# Patient Record
Sex: MALE | Race: WHITE | Employment: OTHER | URBAN - METROPOLITAN AREA
[De-identification: names, ages, dates, MRNs, and addresses within clinical notes are randomized per-mention and may not be internally consistent; named-entity substitution may affect disease eponyms.]

---

## 2018-10-28 ENCOUNTER — APPOINTMENT (OUTPATIENT)
Dept: CT IMAGING | Age: 78
DRG: 194 | End: 2018-10-28
Attending: PHYSICIAN ASSISTANT
Payer: MEDICARE

## 2018-10-28 ENCOUNTER — HOSPITAL ENCOUNTER (INPATIENT)
Age: 78
LOS: 4 days | Discharge: NURSING, REHAB OR LONG-TERM CARE | DRG: 194 | End: 2018-11-02
Attending: EMERGENCY MEDICINE | Admitting: HOSPITALIST
Payer: MEDICARE

## 2018-10-28 ENCOUNTER — APPOINTMENT (OUTPATIENT)
Dept: GENERAL RADIOLOGY | Age: 78
DRG: 194 | End: 2018-10-28
Attending: PHYSICIAN ASSISTANT
Payer: MEDICARE

## 2018-10-28 DIAGNOSIS — S01.01XA LACERATION OF SCALP, INITIAL ENCOUNTER: ICD-10-CM

## 2018-10-28 DIAGNOSIS — M47.9 OSTEOARTHRITIS OF SPINE, UNSPECIFIED SPINAL OSTEOARTHRITIS COMPLICATION STATUS, UNSPECIFIED SPINAL REGION: ICD-10-CM

## 2018-10-28 DIAGNOSIS — J18.9 PNEUMONIA DUE TO INFECTIOUS ORGANISM, UNSPECIFIED LATERALITY, UNSPECIFIED PART OF LUNG: ICD-10-CM
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
## 2018-10-28 DIAGNOSIS — S09.90XA INJURY OF HEAD, INITIAL ENCOUNTER: ICD-10-CM

## 2018-10-28 DIAGNOSIS — G20 PARKINSON DISEASE (HCC): ICD-10-CM

## 2018-10-28 DIAGNOSIS — N39.0 UTI (URINARY TRACT INFECTION), UNCOMPLICATED: ICD-10-CM

## 2018-10-28 DIAGNOSIS — R09.02 HYPOXIA: Primary | ICD-10-CM

## 2018-10-28 LAB
ALBUMIN SERPL-MCNC: 3.2 G/DL (ref 3.4–5)
ALBUMIN/GLOB SERPL: 0.8 {RATIO} (ref 0.8–1.7)
ALP SERPL-CCNC: 83 U/L (ref 45–117)
ALT SERPL-CCNC: 7 U/L (ref 16–61)
ANION GAP SERPL CALC-SCNC: 14 MMOL/L (ref 3–18)
APPEARANCE UR: ABNORMAL
ARTERIAL PATENCY WRIST A: ABNORMAL
AST SERPL-CCNC: 29 U/L (ref 15–37)
BACTERIA URNS QL MICRO: ABNORMAL /HPF
BASE DEFICIT BLD-SCNC: 2 MMOL/L
BASOPHILS # BLD: 0 K/UL (ref 0–0.1)
BASOPHILS NFR BLD: 0 % (ref 0–2)
BDY SITE: ABNORMAL
BILIRUB SERPL-MCNC: 1.8 MG/DL (ref 0.2–1)
BILIRUB UR QL: NEGATIVE
BNP SERPL-MCNC: 251 PG/ML (ref 0–1800)
BUN SERPL-MCNC: 20 MG/DL (ref 7–18)
BUN/CREAT SERPL: 15
CALCIUM SERPL-MCNC: 9.2 MG/DL (ref 8.5–10.1)
CHLORIDE SERPL-SCNC: 105 MMOL/L (ref 100–108)
CK MB CFR SERPL CALC: 1.2 % (ref 0–4)
CK MB SERPL-MCNC: 1.2 NG/ML (ref 5–25)
CK SERPL-CCNC: 100 U/L (ref 39–308)
CO2 SERPL-SCNC: 20 MMOL/L (ref 21–32)
COLOR UR: ABNORMAL
CREAT SERPL-MCNC: 1.3 MG/DL (ref 0.6–1.3)
D DIMER PPP FEU-MCNC: >20 UG/ML(FEU)
DIFFERENTIAL METHOD BLD: ABNORMAL
EOSINOPHIL # BLD: 0.1 K/UL (ref 0–0.4)
EOSINOPHIL NFR BLD: 1 % (ref 0–5)
EPITH CASTS URNS QL MICRO: ABNORMAL /LPF (ref 0–5)
ERYTHROCYTE [DISTWIDTH] IN BLOOD BY AUTOMATED COUNT: 13.8 % (ref 11.6–14.5)
GAS FLOW.O2 O2 DELIVERY SYS: ABNORMAL L/MIN
GLOBULIN SER CALC-MCNC: 3.8 G/DL (ref 2–4)
GLUCOSE SERPL-MCNC: 85 MG/DL (ref 74–99)
GLUCOSE UR STRIP.AUTO-MCNC: NEGATIVE MG/DL
HCO3 BLD-SCNC: 21.8 MMOL/L (ref 22–26)
HCT VFR BLD AUTO: 38.1 % (ref 36–48)
HGB BLD-MCNC: 12.8 G/DL (ref 13–16)
HGB UR QL STRIP: ABNORMAL
KETONES UR QL STRIP.AUTO: ABNORMAL MG/DL
LEUKOCYTE ESTERASE UR QL STRIP.AUTO: ABNORMAL
LYMPHOCYTES # BLD: 2.7 K/UL (ref 0.9–3.6)
LYMPHOCYTES NFR BLD: 28 % (ref 21–52)
MCH RBC QN AUTO: 34.3 PG (ref 24–34)
MCHC RBC AUTO-ENTMCNC: 33.6 G/DL (ref 31–37)
MCV RBC AUTO: 102.1 FL (ref 74–97)
MONOCYTES # BLD: 0.8 K/UL (ref 0.05–1.2)
MONOCYTES NFR BLD: 8 % (ref 3–10)
MUCOUS THREADS URNS QL MICRO: ABNORMAL /LPF
NEUTS SEG # BLD: 6.2 K/UL (ref 1.8–8)
NEUTS SEG NFR BLD: 63 % (ref 40–73)
NITRITE UR QL STRIP.AUTO: NEGATIVE
O2/TOTAL GAS SETTING VFR VENT: 0.21 %
PCO2 BLD: 31.2 MMHG (ref 35–45)
PH BLD: 7.45 [PH] (ref 7.35–7.45)
PH UR STRIP: 5.5 [PH] (ref 5–8)
PLATELET # BLD AUTO: 120 K/UL (ref 135–420)
PMV BLD AUTO: 10.2 FL (ref 9.2–11.8)
PO2 BLD: 55 MMHG (ref 80–100)
POTASSIUM SERPL-SCNC: 4.2 MMOL/L (ref 3.5–5.5)
PROT SERPL-MCNC: 7 G/DL (ref 6.4–8.2)
PROT UR STRIP-MCNC: 30 MG/DL
RBC # BLD AUTO: 3.73 M/UL (ref 4.7–5.5)
RBC #/AREA URNS HPF: ABNORMAL /HPF (ref 0–5)
SAO2 % BLD: 90 % (ref 92–97)
SERVICE CMNT-IMP: ABNORMAL
SODIUM SERPL-SCNC: 139 MMOL/L (ref 136–145)
SP GR UR REFRACTOMETRY: 1.02 (ref 1–1.03)
SPECIMEN TYPE: ABNORMAL
TOTAL RESP. RATE, ITRR: 28
TROPONIN I SERPL-MCNC: <0.02 NG/ML (ref 0–0.04)
UROBILINOGEN UR QL STRIP.AUTO: 1 EU/DL (ref 0.2–1)
WBC # BLD AUTO: 9.8 K/UL (ref 4.6–13.2)
WBC URNS QL MICRO: ABNORMAL /HPF (ref 0–5)

## 2018-10-28 PROCEDURE — 87086 URINE CULTURE/COLONY COUNT: CPT | Performed by: PHYSICIAN ASSISTANT

## 2018-10-28 PROCEDURE — 77030012967 HC SPNG WND OPTPOR BMS -A

## 2018-10-28 PROCEDURE — 82550 ASSAY OF CK (CPK): CPT | Performed by: PHYSICIAN ASSISTANT

## 2018-10-28 PROCEDURE — 81001 URINALYSIS AUTO W/SCOPE: CPT | Performed by: PHYSICIAN ASSISTANT

## 2018-10-28 PROCEDURE — 80053 COMPREHEN METABOLIC PANEL: CPT | Performed by: PHYSICIAN ASSISTANT

## 2018-10-28 PROCEDURE — 96374 THER/PROPH/DIAG INJ IV PUSH: CPT

## 2018-10-28 PROCEDURE — 85025 COMPLETE CBC W/AUTO DIFF WBC: CPT | Performed by: EMERGENCY MEDICINE

## 2018-10-28 PROCEDURE — 93005 ELECTROCARDIOGRAM TRACING: CPT

## 2018-10-28 PROCEDURE — 77030008462 HC STPLR SKN PROX J&J -A

## 2018-10-28 PROCEDURE — 74011250636 HC RX REV CODE- 250/636: Performed by: PHYSICIAN ASSISTANT

## 2018-10-28 PROCEDURE — 83880 ASSAY OF NATRIURETIC PEPTIDE: CPT | Performed by: PHYSICIAN ASSISTANT

## 2018-10-28 PROCEDURE — 74011636320 HC RX REV CODE- 636/320: Performed by: EMERGENCY MEDICINE

## 2018-10-28 PROCEDURE — 71045 X-RAY EXAM CHEST 1 VIEW: CPT

## 2018-10-28 PROCEDURE — 82803 BLOOD GASES ANY COMBINATION: CPT

## 2018-10-28 PROCEDURE — 71275 CT ANGIOGRAPHY CHEST: CPT

## 2018-10-28 PROCEDURE — 85379 FIBRIN DEGRADATION QUANT: CPT | Performed by: PHYSICIAN ASSISTANT

## 2018-10-28 PROCEDURE — 74011000250 HC RX REV CODE- 250: Performed by: PHYSICIAN ASSISTANT

## 2018-10-28 PROCEDURE — 36600 WITHDRAWAL OF ARTERIAL BLOOD: CPT

## 2018-10-28 PROCEDURE — 70450 CT HEAD/BRAIN W/O DYE: CPT

## 2018-10-28 PROCEDURE — 99285 EMERGENCY DEPT VISIT HI MDM: CPT

## 2018-10-28 PROCEDURE — 94762 N-INVAS EAR/PLS OXIMTRY CONT: CPT

## 2018-10-28 RX ORDER — CARBIDOPA AND LEVODOPA 25; 100 MG/1; MG/1
1 TABLET ORAL 3 TIMES DAILY
COMMUNITY

## 2018-10-28 RX ORDER — UREA 10 %
2500 LOTION (ML) TOPICAL DAILY
COMMUNITY

## 2018-10-28 RX ORDER — TAMSULOSIN HYDROCHLORIDE 0.4 MG/1
0.4 CAPSULE ORAL DAILY
COMMUNITY

## 2018-10-28 RX ORDER — GLUCOSAMINE SULFATE 1500 MG
2000 POWDER IN PACKET (EA) ORAL DAILY
COMMUNITY

## 2018-10-28 RX ORDER — PANTOPRAZOLE SODIUM 40 MG/1
40 TABLET, DELAYED RELEASE ORAL DAILY
COMMUNITY

## 2018-10-28 RX ORDER — ASPIRIN 81 MG/1
TABLET ORAL DAILY
COMMUNITY

## 2018-10-28 RX ORDER — PROPRANOLOL HYDROCHLORIDE 80 MG/1
CAPSULE, EXTENDED RELEASE ORAL DAILY
COMMUNITY
End: 2018-11-02

## 2018-10-28 RX ORDER — ALENDRONATE SODIUM 70 MG/1
70 TABLET ORAL
COMMUNITY

## 2018-10-28 RX ORDER — ERGOCALCIFEROL 1.25 MG/1
50000 CAPSULE ORAL
COMMUNITY

## 2018-10-28 RX ORDER — SPIRONOLACTONE 25 MG/1
25 TABLET ORAL DAILY
COMMUNITY

## 2018-10-28 RX ADMIN — CEFTRIAXONE 1 G: 1 INJECTION, POWDER, FOR SOLUTION INTRAMUSCULAR; INTRAVENOUS at 21:53

## 2018-10-28 RX ADMIN — IOPAMIDOL 80 ML: 755 INJECTION, SOLUTION INTRAVENOUS at 23:09

## 2018-10-28 NOTE — ED TRIAGE NOTES
Pt arrived via EMS s/p fall from assisted living facility. Pt states that he tripped before falling, no LOC.

## 2018-10-28 NOTE — ED PROVIDER NOTES
EMERGENCY DEPARTMENT HISTORY AND PHYSICAL EXAM 
 
Date: 10/28/2018 Patient Name: Betty David History of Presenting Illness Chief Complaint Patient presents with  Fall  
  negative LOC  Laceration  
  back of head History Provided By: Patient Chief Complaint: Laceration Duration:  PTA Timing:  Acute Location: Posterior head Modifying Factors: No relieving or worsening factors Associated Symptoms:  mild headache. Additional History (Context):  
5:41 PM 
Betty David is a [de-identified] y.o. male with PMHX of controlled HTN, Parkinson's dz who presents to the emergency department C/O laceration to posterior head secondary to mechanical fall that occurred PTA. Associated sxs include mild headache. Reports that he typically uses a cane to walk, but he forgot it before trying to get up. Pt denies syncope, visual disturbance, neck pain, being on home oxygen, tobacco use, etoh use, and any other sxs or complaints. PCP: Belen, MD Marlene 
 
 
 
Past History Past Medical History: 
Past Medical History:  
Diagnosis Date  BPH (benign prostatic hyperplasia)  GERD (gastroesophageal reflux disease)  Hypertension  Osteopenia  Parkinson's disease ([facility] [address] Past Surgical History: No past surgical history on file. Family History: No family history on file. Social History: 
Social History Tobacco Use  Smoking status: Former Smoker  Smokeless tobacco: Never Used Substance Use Topics  Alcohol use: No  
  Frequency: Never  Drug use: No  
 
 
Allergies: 
No Known Allergies Review of Systems Review of Systems Eyes: Negative for visual disturbance. Musculoskeletal: Negative for neck pain. Skin: Positive for wound (laceration to posterior head). Neurological: Positive for headaches (mild). Negative for syncope. All other systems reviewed and are negative. Physical Exam  
 
Vitals: 10/28/18 2113 10/28/18 2115 10/28/18 2118 10/29/18 0015 BP: 169/68 140/55 112/48 137/50 Pulse: (!) 53 (!) 51 61 62 Resp: 22 27 20 26 Temp:      
SpO2: (!) 89% 90% Weight:      
Height:      
 
Physical Exam  
Constitutional: He is oriented to person, place, and time. He appears well-developed and well-nourished. No distress. HENT:  
Head: Normocephalic. Head is with contusion and with laceration (2 linear lacerations as shown, bleeding controlled). Eyes: Conjunctivae and EOM are normal. Pupils are equal, round, and reactive to light. Neck: Normal range of motion. Neck supple. Cardiovascular: Regular rhythm. Bradycardia present. Pulmonary/Chest: Effort normal and breath sounds normal. He has no wheezes. Abdominal: Soft. Bowel sounds are normal. He exhibits no distension. There is no tenderness. There is no rebound and no guarding. Musculoskeletal: Normal range of motion. He exhibits no edema or tenderness. Neurological: He is alert and oriented to person, place, and time. Mild tremor noted to hand Skin: Skin is warm and dry. Psychiatric: He has a normal mood and affect. His behavior is normal.  
Nursing note and vitals reviewed. Diagnostic Study Results Labs - Recent Results (from the past 12 hour(s)) EKG, 12 LEAD, INITIAL Collection Time: 10/28/18  5:41 PM  
Result Value Ref Range Ventricular Rate 45 BPM  
 Atrial Rate 45 BPM  
 P-R Interval 226 ms  
 QRS Duration 98 ms Q-T Interval 506 ms QTC Calculation (Bezet) 437 ms Calculated P Axis 86 degrees Calculated R Axis -29 degrees Calculated T Axis 10 degrees Diagnosis Marked sinus bradycardia with 1st degree AV block Minimal voltage criteria for LVH, may be normal variant Cannot rule out Anterior infarct , age undetermined Abnormal ECG No previous ECGs available METABOLIC PANEL, COMPREHENSIVE Collection Time: 10/28/18  6:40 PM  
Result Value Ref Range Sodium 139 136 - 145 mmol/L Potassium 4.2 3.5 - 5.5 mmol/L Chloride 105 100 - 108 mmol/L  
 CO2 20 (L) 21 - 32 mmol/L Anion gap 14 3.0 - 18 mmol/L Glucose 85 74 - 99 mg/dL BUN 20 (H) 7.0 - 18 MG/DL Creatinine 1.30 0.6 - 1.3 MG/DL  
 BUN/Creatinine ratio 15 GFR est AA >60 >60 ml/min/1.73m2 GFR est non-AA 53 (L) >60 ml/min/1.73m2 Calcium 9.2 8.5 - 10.1 MG/DL Bilirubin, total 1.8 (H) 0.2 - 1.0 MG/DL  
 ALT (SGPT) 7 (L) 16 - 61 U/L  
 AST (SGOT) 29 15 - 37 U/L Alk. phosphatase 83 45 - 117 U/L Protein, total 7.0 6.4 - 8.2 g/dL Albumin 3.2 (L) 3.4 - 5.0 g/dL Globulin 3.8 2.0 - 4.0 g/dL A-G Ratio 0.8 0.8 - 1.7 CARDIAC PANEL,(CK, CKMB & TROPONIN) Collection Time: 10/28/18  6:40 PM  
Result Value Ref Range  39 - 308 U/L  
 CK - MB 1.2 <3.6 ng/ml CK-MB Index 1.2 0.0 - 4.0 % Troponin-I, Qt. <0.02 0.0 - 0.045 NG/ML  
NT-PRO BNP Collection Time: 10/28/18  6:40 PM  
Result Value Ref Range NT pro- 0 - 1,800 PG/ML  
D DIMER Collection Time: 10/28/18  6:40 PM  
Result Value Ref Range D DIMER >20.00 (H) <0.46 ug/ml(FEU) URINALYSIS W/ RFLX MICROSCOPIC Collection Time: 10/28/18  7:40 PM  
Result Value Ref Range Color DARK YELLOW Appearance CLOUDY Specific gravity 1.017 1.005 - 1.030    
 pH (UA) 5.5 5.0 - 8.0 Protein 30 (A) NEG mg/dL Glucose NEGATIVE  NEG mg/dL Ketone TRACE (A) NEG mg/dL Bilirubin NEGATIVE  NEG Blood MODERATE (A) NEG Urobilinogen 1.0 0.2 - 1.0 EU/dL Nitrites NEGATIVE  NEG Leukocyte Esterase LARGE (A) NEG URINE MICROSCOPIC ONLY Collection Time: 10/28/18  7:40 PM  
Result Value Ref Range WBC 70 to 80 0 - 5 /hpf  
 RBC 0 to 5 0 - 5 /hpf Epithelial cells FEW 0 - 5 /lpf Bacteria 1+ (A) NEG /hpf Mucus FEW (A) NEG /lpf  
CBC WITH AUTOMATED DIFF Collection Time: 10/28/18  9:27 PM  
Result Value Ref Range WBC 9.8 4.6 - 13.2 K/uL  
 RBC 3.73 (L) 4.70 - 5.50 M/uL HGB 12.8 (L) 13.0 - 16.0 g/dL HCT 38.1 36.0 - 48.0 % .1 (H) 74.0 - 97.0 FL  
 MCH 34.3 (H) 24.0 - 34.0 PG  
 MCHC 33.6 31.0 - 37.0 g/dL  
 RDW 13.8 11.6 - 14.5 % PLATELET 780 (L) 583 - 420 K/uL MPV 10.2 9.2 - 11.8 FL  
 NEUTROPHILS 63 40 - 73 % LYMPHOCYTES 28 21 - 52 % MONOCYTES 8 3 - 10 % EOSINOPHILS 1 0 - 5 % BASOPHILS 0 0 - 2 %  
 ABS. NEUTROPHILS 6.2 1.8 - 8.0 K/UL  
 ABS. LYMPHOCYTES 2.7 0.9 - 3.6 K/UL  
 ABS. MONOCYTES 0.8 0.05 - 1.2 K/UL  
 ABS. EOSINOPHILS 0.1 0.0 - 0.4 K/UL  
 ABS. BASOPHILS 0.0 0.0 - 0.1 K/UL  
 DF AUTOMATED    
POC G3 Collection Time: 10/28/18 10:11 PM  
Result Value Ref Range Device: ROOM AIR    
 FIO2 (POC) 0.21 % pH (POC) 7.452 (H) 7.35 - 7.45    
 pCO2 (POC) 31.2 (L) 35.0 - 45.0 MMHG  
 pO2 (POC) 55 (L) 80 - 100 MMHG  
 HCO3 (POC) 21.8 (L) 22 - 26 MMOL/L  
 sO2 (POC) 90 (L) 92 - 97 % Base deficit (POC) 2 mmol/L Allens test (POC) N/A Total resp. rate 28 Site RIGHT RADIAL Specimen type (POC) ARTERIAL Performed by [facility] Radiologic Studies: 
 
12:29 AM 
RADIOLOGY FINDINGS Chest X-ray shows NAP Pending review by Radiologist 
Recorded by REBECA Sandy, as dictated by Claire Morgan PA-C 
 
CTA CHEST W OR W WO CONT Final Result IMPRESSION: 
  
1. No evidence of pulmonary embolism. 
  
2. Lower lung field interstitial coarsening may be chronic versus 
bronchitis/pneumonitis.  
  
3. Significant compression fractures of T5, T8 and T10. 
  
4. Significant T12 compression fracture with significant retropulsion with 
resultant significant spinal cord narrowing encroaching on the spinal cord. Early cord compression is considered. 
  
Findings were discussed with referring physician just prior to signing report. CT HEAD WO CONT Final Result IMPRESSION:  
   
1. No acute intracranial hemorrhage, mass effect, midline shift, or herniation. No definite CT evidence of acute cortical infarct is seen. Please note that  
noncontrast head CT may be normal in early acute infarct. 2. Parieto-occipital scalp contusion and laceration. No evidence of underlying  
calvarial fracture. 3. Mild nonspecific white matter disease likely representing chronic small  
vessel changes. 4. Age-indeterminate right nasal bone fracture, suspect chronic given lack of  
visualized associated soft tissue contusion. No prior comparison study. Clinical  
correlation recommended. CT Results  (Last 48 hours) 10/28/18 1905  CT HEAD WO CONT Final result Impression:  IMPRESSION:  
   
1. No acute intracranial hemorrhage, mass effect, midline shift, or herniation. No definite CT evidence of acute cortical infarct is seen. Please note that  
noncontrast head CT may be normal in early acute infarct. 2. Parieto-occipital scalp contusion and laceration. No evidence of underlying  
calvarial fracture. 3. Mild nonspecific white matter disease likely representing chronic small  
vessel changes. 4. Age-indeterminate right nasal bone fracture, suspect chronic given lack of  
visualized associated soft tissue contusion. No prior comparison study. Clinical  
correlation recommended. Narrative:  EXAM: CT HEAD W/O CONTRAST INDICATION: Closed head injury, fall COMPARISON: No prior comparison study. TECHNIQUE: Axial CT imaging of the head was performed without intravenous  
contrast.  Additional coronal and sagittal reconstructions were performed. One  
or more dose reduction techniques were used on this CT: automated exposure  
control, adjustment of the mAs and/or kVp according to patient's size, and  
iterative reconstruction techniques. The specific techniques utilized on this CT  
exam have been documented in the patient's electronic medical record.  
_______________ FINDINGS:  
   
 VENTRICLES/EXTRA-AXIAL SPACES: The ventricles and sulci are mildly enlarged  
consistent with diffuse volume loss. BRAIN PARENCHYMA: There is no evidence of acute intracranial hemorrhage, mass  
effect, midline shift, or herniation. No definite CT evidence of acute cortical  
infarct is seen. A mild amount of hypodense white matter lesions are seen within  
the periventricular and subcortical white matter which are nonspecific, but  
likely represent chronic small vessel changes. ORBITS: The bilateral lenses are absent, likely due to prior cataract surgery. PARANASAL SINUSES/MASTOIDS: Visualized paranasal sinuses are clear. Visualized  
mastoid air cells are clear. OSSEOUS STRUCTURES: Deformity of the right nasal bone is present. No overlying  
soft tissue swelling is seen. OTHER: Increased attenuation soft tissue swelling is present in the  
parieto-occipital region of the scalp representing contusion. Additional  
punctate foci of gas are present compatible with laceration. No underlying  
calvarial fracture is seen. Atherosclerotic calcifications are present.  
   
_______________ CXR Results  (Last 48 hours) None Medications given in the ED- Medications  
cefTRIAXone (ROCEPHIN) 1 g in sterile water (preservative free) 10 mL IV syringe (1 g IntraVENous Given 10/28/18 4265) iopamidol (ISOVUE-370) 76 % injection 80 mL (80 mL IntraVENous Given 10/28/18 1002) Medical Decision Making I am the first provider for this patient. I reviewed the vital signs, available nursing notes, past medical history, past surgical history, family history and social history. Vital Signs-Reviewed the patient's vital signs. 17:34 Pulse Oximetry Analysis - 87% on RA  
18:30 Pulse Oximetry Analysis - 100% on NC 1L 
9:32 PM Pulse Oximetry Analysis - 94% on RA Cardiac Monitor: 
Rate: 45 bpm 
Rhythm: Sinus bradycardia EKG interpretation: (Preliminary) 5:42 PM  
45 bpm, sinus bradycardia, no ectopy EKG read by Emmanuelle Kaur PA-C at 6:00 PM  
 
Records Reviewed: Nursing Notes and Old Medical Records Procedures: 
Wound Repair 
Date/Time: 10/28/2018 10:39 PM 
Performed by: PAPreparation: skin prepped with Shur-Clens Pre-procedure re-eval: Immediately prior to the procedure, the patient was reevaluated and found suitable for the planned procedure and any planned medications. Time out: Immediately prior to the procedure a time out was called to verify the correct patient, procedure, equipment, staff and marking as appropriate. David Herzog Location details: scalp Wound length:7.6 - 12.5 cm Foreign bodies: no foreign bodies Debridement: none Skin closure: staples (12) Dressing: 4x4 Patient tolerance: Patient tolerated the procedure well with no immediate complications My total time at bedside, performing this procedure was 1-15 minutes. ED Course:  
5:41 PM Initial assessment performed. The patients presenting problems have been discussed, and they are in agreement with the care plan formulated and outlined with them. I have encouraged them to ask questions as they arise throughout their visit. 9:28 PM Nurse at bedside to redraw CBC. Pt endorses generalized fatigue, though states he feels well otherwise. Discussed fining of UTI and need for abx. Pt understands. Discussed further his hopes for being discharged home. He lives at [facility]. He has a cane and a walker, but he has been reluctant to use until today. 9:50 PM JACK Lynch consult with Dr Meggan Hector, reviewed labs & imaging, suggests adding ABG ddimer and BNP for further evaluation of hypoxia. 11:54 PM Pt's oxygen saturation noted to be hovering between 88-89% on RA; pulses in the 50s; CTA still pending. Will put pt back on oxygen. 12:05 AM Dr. Gisell Lilly, Radiology, called noting multiple compression fractures of the thoracic spine with some cord compression. 12:11 AM Discussed patient's history, exam, and available diagnostics results with Shruthi Kothari MD, Emergency Medicine, who will evaluate the patient. FACE-TO-FACE PROGRESS NOTE: 
12:18 AM 
The patient presents with congestion onset one month ago with no pain, no fever. He has low oxygen, but is in no distress. My exam shows coarse breath sounds at the bases bilaterally, regular rate and rhythm, abdomen soft and non-tender. Imp/plan:  Recommend consult with hospitalist for admission. I have personally seen and examined the patient, reviewed the CR's note and agree with findings and plan. Written by REBECA Schmidt, as dictated by Shruthi Kothari MD. 
 
12:23 AM Discussed patient's history, exam, and available diagnostics results with Alanis Phillip MD, internal medicine, who agrees to admit the pt to [facility]. Diagnosis and Disposition 12:26 AM 
I have spent 45 minutes of critical care time involved in lab review, consultations with specialist, family decision-making, and documentation. During this entire length of time I was immediately available to the patient. Critical Care: The reason for providing this level of medical care for this critically ill patient was due a critical illness that impaired one or more vital organ systems such that there was a high probability of imminent or life threatening deterioration in the patients condition. This care involved high complexity decision making to assess, manipulate, and support vital system functions, to treat this degreee vital organ system failure and to prevent further life threatening deterioration of the patients condition. Core Measures: 
For Hospitalized Patients: 
 
1. Hospitalization Decision Time: The decision to hospitalize the patient was made by Trena Walker PA-C at 12:28 AM on 10/28/2018 2.  Aspirin: Aspirin was not given because the patient did not present with a stroke at the time of their Emergency Department evaluation 12:25 AM  Patient is being admitted to the hospital by Allyn Tejada MD. The results of their tests and reasons for their admission have been discussed with them and/or available family. They convey agreement and understanding for the need to be admitted and for their admission diagnosis. CONDITIONS ON ADMISSION: 
Sepsis is not present at the time of admission. Deep Vein Thrombosis is not present at the time of admission. Thrombosis is not present at the time of admission. Urinary Tract Infection is present at the time of admission. Pneumonia is present at the time of admission. MRSA is not present at the time of admission. Wound infection is not present at the time of admission. Pressure Ulcer is not present at the time of admission. CLINICAL IMPRESSION: 
 
1. Hypoxia 2. Fall, initial encounter 3. Injury of head, initial encounter 4. Laceration of scalp, initial encounter 5. UTI (urinary tract infection), uncomplicated 6. Pneumonia due to infectious organism, unspecified laterality, unspecified part of lung 7. Osteoarthritis of spine, unspecified spinal osteoarthritis complication status, unspecified spinal region 8. Parkinson disease ([facility] [address] PLAN: 
1. Admit pt to Tele. 
_______________________________ Attestations: This note is prepared by Patsy Myers, acting as Scribe for Amador Simons PA-C. Amador Simons PA-C:  The scribe's documentation has been prepared under my direction and personally reviewed by me in its entirety. I confirm that the note above accurately reflects all work, treatment, procedures, and medical decision making performed by me. 
_______________________________

## 2018-10-29 ENCOUNTER — APPOINTMENT (OUTPATIENT)
Dept: VASCULAR SURGERY | Age: 78
DRG: 194 | End: 2018-10-29
Attending: HOSPITALIST
Payer: MEDICARE

## 2018-10-29 ENCOUNTER — APPOINTMENT (OUTPATIENT)
Dept: MRI IMAGING | Age: 78
DRG: 194 | End: 2018-10-29
Attending: HOSPITALIST
Payer: MEDICARE

## 2018-10-29 PROBLEM — S22.000A COMPRESSION FRACTURE OF BODY OF THORACIC VERTEBRA (HCC): Status: ACTIVE | Noted: 2018-10-29

## 2018-10-29 PROBLEM — N30.00 ACUTE CYSTITIS WITHOUT HEMATURIA: Status: ACTIVE | Noted: 2018-10-29

## 2018-10-29 PROBLEM — R09.02 HYPOXIA: Status: ACTIVE | Noted: 2018-10-29

## 2018-10-29 PROBLEM — J18.9 PNEUMONIA: Status: ACTIVE | Noted: 2018-10-29

## 2018-10-29 PROCEDURE — 72157 MRI CHEST SPINE W/O & W/DYE: CPT

## 2018-10-29 PROCEDURE — 77010033678 HC OXYGEN DAILY

## 2018-10-29 PROCEDURE — A9575 INJ GADOTERATE MEGLUMI 0.1ML: HCPCS | Performed by: HOSPITALIST

## 2018-10-29 PROCEDURE — 74011250636 HC RX REV CODE- 250/636: Performed by: HOSPITALIST

## 2018-10-29 PROCEDURE — 74011000250 HC RX REV CODE- 250: Performed by: HOSPITALIST

## 2018-10-29 PROCEDURE — 74011636320 HC RX REV CODE- 636/320: Performed by: HOSPITALIST

## 2018-10-29 PROCEDURE — 93970 EXTREMITY STUDY: CPT

## 2018-10-29 PROCEDURE — 74011250637 HC RX REV CODE- 250/637: Performed by: HOSPITALIST

## 2018-10-29 PROCEDURE — 65660000000 HC RM CCU STEPDOWN

## 2018-10-29 RX ORDER — PROPRANOLOL HYDROCHLORIDE 80 MG/1
80 CAPSULE, EXTENDED RELEASE ORAL DAILY
Status: DISCONTINUED | OUTPATIENT
Start: 2018-10-29 | End: 2018-10-29

## 2018-10-29 RX ORDER — SODIUM CHLORIDE 0.9 % (FLUSH) 0.9 %
5-10 SYRINGE (ML) INJECTION EVERY 8 HOURS
Status: DISCONTINUED | OUTPATIENT
Start: 2018-10-29 | End: 2018-11-02 | Stop reason: HOSPADM

## 2018-10-29 RX ORDER — ASPIRIN 81 MG/1
81 TABLET ORAL DAILY
Status: DISCONTINUED | OUTPATIENT
Start: 2018-10-29 | End: 2018-11-02 | Stop reason: HOSPADM

## 2018-10-29 RX ORDER — SODIUM CHLORIDE 0.9 % (FLUSH) 0.9 %
5-10 SYRINGE (ML) INJECTION AS NEEDED
Status: DISCONTINUED | OUTPATIENT
Start: 2018-10-29 | End: 2018-11-02 | Stop reason: HOSPADM

## 2018-10-29 RX ORDER — ENOXAPARIN SODIUM 100 MG/ML
40 INJECTION SUBCUTANEOUS EVERY 24 HOURS
Status: DISCONTINUED | OUTPATIENT
Start: 2018-10-29 | End: 2018-11-02 | Stop reason: HOSPADM

## 2018-10-29 RX ORDER — ACETAMINOPHEN 325 MG/1
650 TABLET ORAL
Status: DISCONTINUED | OUTPATIENT
Start: 2018-10-29 | End: 2018-11-02 | Stop reason: HOSPADM

## 2018-10-29 RX ORDER — SPIRONOLACTONE 25 MG/1
25 TABLET ORAL DAILY
Status: DISCONTINUED | OUTPATIENT
Start: 2018-10-29 | End: 2018-11-02 | Stop reason: HOSPADM

## 2018-10-29 RX ORDER — TAMSULOSIN HYDROCHLORIDE 0.4 MG/1
0.4 CAPSULE ORAL DAILY
Status: DISCONTINUED | OUTPATIENT
Start: 2018-10-29 | End: 2018-11-02 | Stop reason: HOSPADM

## 2018-10-29 RX ORDER — PANTOPRAZOLE SODIUM 40 MG/1
40 TABLET, DELAYED RELEASE ORAL DAILY
Status: DISCONTINUED | OUTPATIENT
Start: 2018-10-29 | End: 2018-11-02 | Stop reason: HOSPADM

## 2018-10-29 RX ORDER — CARBIDOPA AND LEVODOPA 25; 100 MG/1; MG/1
1 TABLET ORAL 3 TIMES DAILY
Status: DISCONTINUED | OUTPATIENT
Start: 2018-10-29 | End: 2018-11-02 | Stop reason: HOSPADM

## 2018-10-29 RX ADMIN — TAMSULOSIN HYDROCHLORIDE 0.4 MG: 0.4 CAPSULE ORAL at 10:01

## 2018-10-29 RX ADMIN — WATER 2 G: 1 INJECTION INTRAMUSCULAR; INTRAVENOUS; SUBCUTANEOUS at 22:09

## 2018-10-29 RX ADMIN — PANTOPRAZOLE SODIUM 40 MG: 40 TABLET, DELAYED RELEASE ORAL at 10:01

## 2018-10-29 RX ADMIN — CARBIDOPA AND LEVODOPA 1 TABLET: 25; 100 TABLET ORAL at 22:07

## 2018-10-29 RX ADMIN — CARBIDOPA AND LEVODOPA 1 TABLET: 25; 100 TABLET ORAL at 10:01

## 2018-10-29 RX ADMIN — GADOTERATE MEGLUMINE 15 ML: 376.9 INJECTION INTRAVENOUS at 17:56

## 2018-10-29 RX ADMIN — PROPRANOLOL HYDROCHLORIDE 80 MG: 80 CAPSULE, EXTENDED RELEASE ORAL at 10:05

## 2018-10-29 RX ADMIN — SODIUM CHLORIDE 500 MG: 900 INJECTION, SOLUTION INTRAVENOUS at 04:02

## 2018-10-29 RX ADMIN — SPIRONOLACTONE 25 MG: 25 TABLET ORAL at 10:01

## 2018-10-29 RX ADMIN — Medication 10 ML: at 13:03

## 2018-10-29 RX ADMIN — Medication 81 MG: at 10:01

## 2018-10-29 RX ADMIN — Medication 10 ML: at 22:09

## 2018-10-29 RX ADMIN — Medication 10 ML: at 10:01

## 2018-10-29 RX ADMIN — CARBIDOPA AND LEVODOPA 1 TABLET: 25; 100 TABLET ORAL at 16:04

## 2018-10-29 NOTE — PROGRESS NOTES
Reason for Admission: Nanci Dixon is a [de-identified] y.o. male with PMHX of controlled HTN, Parkinson's dz who presents to the emergency department C/O laceration to posterior head secondary to mechanical fall that occurred PTA. Associated sxs include mild headache. Reports that he typically uses a cane to walk, but he forgot it before trying to get up. Pt denies syncope, visual disturbance, neck pain, being on home oxygen, tobacco use, etoh use, and any other sxs or complaints. Patient admitted for medical management hypoxia, Pneumonia, UTI.  
  
 
    
                
RRAT Score:          10 Plan for utilizing home health:  tbd Likelihood of Readmission:  tbd 
                      
Transition of Care Plan:  Met with patient at bedside. He states his wife passed away in May. So now he lives in assisted living at [facility]. Patient reports he was not sure why he is here. States he does have a son. He does not know the phone number. There is no phone number on the chart. Telephone call to [facility] as to plans for d/c had to leave message. Patient reports he wants to return home  To [facility] when he is d/lauren. Patient presently on oxygen states he does not wear oxygen. Will need to be weaned or have oxygen walk test performed. Cm will continue to follow

## 2018-10-29 NOTE — PROGRESS NOTES
JACK Stover notified of low HR. Patient asymptomatic. Inderall discontinued per order. Will continue to monitor. Patient stable.

## 2018-10-29 NOTE — H&P
History & Physical 
Patient: Yobani Song MRN: [id number]  CSN: [id number] YOB: 1940  Age: [de-identified] y.o. Sex: male DOA: 10/28/2018 Primary Care Provider:  Belen, MD Marlene 
 
 
Assessment/Plan Patient Active Problem List  
Diagnosis Code  Hypoxia R09.02  
 Parkinson's disease ([facility] G20  
 GERD (gastroesophageal reflux disease) K21.9  Hypertension I10  
 BPH (benign prostatic hyperplasia) N40.0  Acute cystitis without hematuria N30.00  Pneumonia J18.9  Compression fracture of body of thoracic vertebra (HCC) S22.000A Admit to telemetry Hypoxia - CT scan chest showed no evidence of PE, Lower lung field interstitial coarsening may be chronic versus bronchitis/pneumonitis. Encourage incentive spirometry, oxygen by NC, wean as tolerated. Pneumonia - start on ceftriaxone and azithromycin. UTI - antibiotics as above, follow urine cultures. HTN - controlled on home medications. Parkinson's disease - continue levadopa carbidopa BPH - on flomax GERD - continue with PPI Compression fracture - thoracic spine, no back pain. PT/OT 
 
DVT prophylaxis. Estimated length of stay : 2-3 days CC: fall HPI:  
 
Yobani Song is a [de-identified] y.o. male who has past history of parkinsons, HTN, BPH, GERD is brought to ER with concerns of fall, patient lives at assisted living, he reports that he was getting up to go to lunch and he realized he forgot his cane and he turned around to get his cane and he fell. He is brought to ER. Patient as such denies any complains except for urinary incontinence. He was evaluated in ER and he was noted to have laceration on the back of his head. His head CT with no acute findings. He is noted to be hypoxic with oxygen saturations in 80s. His CXR with no acute changes. His D-Dimer checked and it was elevated at >20.  He had CT chest that did not show evidence of PE, showed Lower lung field interstitial coarsening may be chronic versus bronchitis/pneumonitis. His UA with evidence of UTI. Past Medical History:  
Diagnosis Date  BPH (benign prostatic hyperplasia)  GERD (gastroesophageal reflux disease)  Hypertension  Osteopenia  Parkinson's disease ([address] No past surgical history on file. No family history on file. Social History Socioeconomic History  Marital status:  Spouse name: Not on file  Number of children: Not on file  Years of education: Not on file  Highest education level: Not on file Social Needs  Financial resource strain: Not on file  Food insecurity - worry: Not on file  Food insecurity - inability: Not on file  Transportation needs - medical: Not on file  Transportation needs - non-medical: Not on file Occupational History  Not on file Tobacco Use  Smoking status: Former Smoker  Smokeless tobacco: Never Used Substance and Sexual Activity  Alcohol use: No  
  Frequency: Never  Drug use: No  
 Sexual activity: Not Currently Other Topics Concern  Not on file Social History Narrative  Not on file Prior to Admission medications Medication Sig Start Date End Date Taking? Authorizing Provider  
alendronate (FOSAMAX) 70 mg tablet Take  by mouth. Yes Other, MD Marlene  
aspirin delayed-release 81 mg tablet Take  by mouth daily. Yes Other, MD Marlene  
carbidopa-levodopa (SINEMET)  mg per tablet Take 1 Tab by mouth three (3) times daily. Yes Other, MD Marlene  
pantoprazole (PROTONIX) 40 mg tablet Take 40 mg by mouth daily. Yes Other, MD Marlene  
Vit A,C,E-Zinc-Copper (PRESERVISION AREDS) cap capsule Take 1 Cap by mouth. Yes Other, MD Marlene  
propranolol LA (INDERAL LA) 80 mg SR capsule Take  by mouth daily. Yes Other, MD Marlene  
spironolactone (ALDACTONE) 25 mg tablet Take 25 mg by mouth daily. Yes Other, MD Marlene  
tamsulosin (FLOMAX) 0.4 mg capsule Take 0.4 mg by mouth daily.    Yes Other, MD Marlene  
cyanocobalamin (VITAMIN B-12) 100 mcg tablet Take 2,500 mcg by mouth daily. Yes Marlene Glover MD  
ergocalciferol (VITAMIN D2) 50,000 unit capsule Take 50,000 Units by mouth. Yes Belen, MD Marlene  
cholecalciferol (VITAMIN D3) 1,000 unit cap Take 2,000 Units by mouth daily. Yes Belen, MD Marlene  
 
 
No Known Allergies Review of Systems Gen: No fever, chills, malaise, weight loss/gain. Heent: No headache, rhinorrhea, epistaxis, ear pain, hearing loss, sinus pain, neck pain/stiffness, sore throat. Heart: No chest pain, palpitations, RODRIGUEZ, pnd, or orthopnea. Resp: No cough, hemoptysis, wheezing and shortness of breath. GI: No nausea, vomiting, diarrhea, constipation, melena or hematochezia. : No urinary obstruction, dysuria or hematuria. Derm: No rash, new skin lesion or pruritis. Musc/skeletal: no bone or joint complains. Vasc: No edema, cyanosis or claudication. Endo: No heat/cold intolerance, no polyuria,polydipsia or polyphagia. Neuro: No unilateral weakness, numbness, tingling. No seizures. Heme: No easy bruising or bleeding. Physical Exam:  
 
Physical Exam: 
Visit Vitals /54 Pulse (!) 59 Temp 99.1 °F (37.3 °C) Resp 20 Ht 5' 11\" (1.803 m) Wt 80.3 kg (177 lb) SpO2 99% BMI 24.69 kg/m² O2 Flow Rate (L/min): 2 l/min O2 Device: Nasal cannula Temp (24hrs), Av.5 °F (36.9 °C), Min:97.9 °F (36.6 °C), Max:99.1 °F (37.3 °C) No intake/output data recorded. No intake/output data recorded. General:  Awake, cooperative, no distress. Head:  Normocephalic, without obvious abnormality, atraumatic. Eyes:  Conjunctivae/corneas clear, sclera anicteric, PERRL, EOMs intact. Nose: Nares normal. No drainage or sinus tenderness. Throat: Lips, mucosa, and tongue normal.   
Neck: Supple, symmetrical, trachea midline, no adenopathy. Lungs:   Clear to auscultation bilaterally. Heart:  Regular rate and rhythm, S1, S2 normal, no murmur, click, rub or gallop. Abdomen: Soft, non-tender. Bowel sounds normal. No masses,  No organomegaly. Extremities: Extremities normal, atraumatic, no cyanosis or edema. Capillary refill normal.  
Pulses: 2+ and symmetric all extremities. Skin: Skin color pink, turgor normal. No rashes or lesions, laceration back of head Neurologic: CNII-XII intact. No focal motor or sensory deficit. Labs Reviewed: 
 
CMP:  
Lab Results Component Value Date/Time  10/28/2018 06:40 PM  
 K 4.2 10/28/2018 06:40 PM  
  10/28/2018 06:40 PM  
 CO2 20 (L) 10/28/2018 06:40 PM  
 AGAP 14 10/28/2018 06:40 PM  
 GLU 85 10/28/2018 06:40 PM  
 BUN 20 (H) 10/28/2018 06:40 PM  
 CREA 1.30 10/28/2018 06:40 PM  
 GFRAA >60 10/28/2018 06:40 PM  
 GFRNA 53 (L) 10/28/2018 06:40 PM  
 CA 9.2 10/28/2018 06:40 PM  
 ALB 3.2 (L) 10/28/2018 06:40 PM  
 TP 7.0 10/28/2018 06:40 PM  
 GLOB 3.8 10/28/2018 06:40 PM  
 AGRAT 0.8 10/28/2018 06:40 PM  
 SGOT 29 10/28/2018 06:40 PM  
 ALT 7 (L) 10/28/2018 06:40 PM  
 
CBC:  
Lab Results Component Value Date/Time WBC 9.8 10/28/2018 09:27 PM  
 HGB 12.8 (L) 10/28/2018 09:27 PM  
 HCT 38.1 10/28/2018 09:27 PM  
  (L) 10/28/2018 09:27 PM  
 
All Cardiac Markers in the last 24 hours:  
Lab Results Component Value Date/Time  10/28/2018 06:40 PM  
 CKMB 1.2 10/28/2018 06:40 PM  
 CKND1 1.2 10/28/2018 06:40 PM  
 TROIQ <0.02 10/28/2018 06:40 PM  
 
 
 
Procedures/imaging: see electronic medical records for all procedures/Xrays and details which were not copied into this note but were reviewed prior to creation of Plan CC: Belen, MD Marlene

## 2018-10-29 NOTE — PROGRESS NOTES
Patient seen and examined this AM after overnight admission by Dr Harvest Riedel. Patient continued on supplemental oxygen, 2L/min. Wean as tolerated. Continue current abx choices. Hold PT for now, CT scan showing significant T12 compression fracture with significant retropulsion with resultant significant spinal cord narrowing encroaching on the spinal cord, with early cord compression considered. Will obtain STAT MRI now, further plan to follow. Head laceration with minimal bleeding during examination. Will remain available throughout the day for questions/concerns.

## 2018-10-29 NOTE — ROUTINE PROCESS
TRANSFER - IN REPORT: 
 
Verbal report received from SHARONA Jimenez RN(name) on [facility]  being received from ED(unit) for routine progression of care Report consisted of patients Situation, Background, Assessment and  
Recommendations(SBAR). Information from the following report(s) SBAR, Kardex and ED Summary was reviewed with the receiving nurse. Opportunity for questions and clarification was provided. Assessment completed upon patients arrival to unit and care assumed.

## 2018-10-29 NOTE — PROGRESS NOTES
Problem: Falls - Risk of 
Goal: *Absence of Falls Document Clif Fur Fall Risk and appropriate interventions in the flowsheet. Outcome: Progressing Towards Goal 
Fall Risk Interventions: 
Mobility Interventions: Bed/chair exit alarm, PT Consult for assist device competence Medication Interventions: Bed/chair exit alarm, Patient to call before getting OOB, Teach patient to arise slowly Elimination Interventions: Bed/chair exit alarm, Toileting schedule/hourly rounds History of Falls Interventions: Bed/chair exit alarm Problem: Pressure Injury - Risk of 
Goal: *Prevention of pressure injury Document Kd Scale and appropriate interventions in the flowsheet. Outcome: Progressing Towards Goal 
Pressure Injury Interventions: 
Sensory Interventions: Assess changes in LOC Moisture Interventions: Absorbent underpads Activity Interventions: Pressure redistribution bed/mattress(bed type) Mobility Interventions: Pressure redistribution bed/mattress (bed type), PT/OT evaluation Nutrition Interventions: Document food/fluid/supplement intake

## 2018-10-29 NOTE — PROGRESS NOTES
0145: Received patient from the ED nurse. Bed alarm activated. 0210: Dr Lanie Aburto at bedside to see patient. 0225: Admission assessment completed and documented. Patient is alert and oriented x4. Incontinence care completed. 0404: The patient has refused their anticoagulant for DVT prophylaxis. The patient has been educated about the risk of acquiring a DVT by refusing this medication and states verbal understanding. Will notify MD. 
 
3344: Patient found standing on the side of the bed. Patient was reoriented and put back to bed. Bed alarm on.  
 
0630: Unevenful shift. Leaving patient this AM alert and oriented. 0730: Bedside and Verbal shift change report given to Lucero Jiménez RN (oncoming nurse) by Main Duong RN (offgoing nurse). Report included the following information SBAR, Kardex and MAR.

## 2018-10-29 NOTE — ED NOTES
Pt transported from CT via stretcher back to ED no acute distress noted pt placed back on cardiac monitor.

## 2018-10-29 NOTE — ROUTINE PROCESS
TRANSFER - OUT REPORT: 
 
Verbal report given to [facility] RN (name) on Tessa Mancuso  being transferred to [employer] (unit) for routine progression of care Report consisted of patients Situation, Background, Assessment and  
Recommendations(SBAR). Information from the following report(s) SBAR, ED Summary, Procedure Summary, Intake/Output, MAR, Recent Results and Cardiac Rhythm NS  was reviewed with the receiving nurse. Lines:  
Peripheral IV 10/28/18 Right; Anterior Antecubital (Active) Site Assessment Clean, dry, & intact 10/28/2018  8:41 PM  
Phlebitis Assessment 0 10/28/2018  8:41 PM  
Infiltration Assessment 0 10/28/2018  8:41 PM  
Dressing Status Clean, dry, & intact 10/28/2018  8:41 PM  
Dressing Type Tape;Transparent 10/28/2018  8:41 PM  
Hub Color/Line Status Green 10/28/2018  8:41 PM  
Action Taken Blood drawn 10/28/2018  8:41 PM  
  
 
Opportunity for questions and clarification was provided. Patient transported with: 
 Monitor O2 @ 2 liters Registered Nurse

## 2018-10-29 NOTE — ED NOTES
Call placed at this time to pts evie Quinonez Edwin [phone] per pts request  frankypriscilla informed this RN he would like to be informed when pt is ready for discharge.

## 2018-10-30 LAB
ANION GAP SERPL CALC-SCNC: 9 MMOL/L (ref 3–18)
BACTERIA SPEC CULT: NORMAL
BUN SERPL-MCNC: 19 MG/DL (ref 7–18)
BUN/CREAT SERPL: 17
CALCIUM SERPL-MCNC: 8.9 MG/DL (ref 8.5–10.1)
CHLORIDE SERPL-SCNC: 108 MMOL/L (ref 100–108)
CO2 SERPL-SCNC: 25 MMOL/L (ref 21–32)
CREAT SERPL-MCNC: 1.13 MG/DL (ref 0.6–1.3)
ERYTHROCYTE [DISTWIDTH] IN BLOOD BY AUTOMATED COUNT: 13.8 % (ref 11.6–14.5)
GLUCOSE SERPL-MCNC: 84 MG/DL (ref 74–99)
HCT VFR BLD AUTO: 35.7 % (ref 36–48)
HGB BLD-MCNC: 11.9 G/DL (ref 13–16)
MCH RBC QN AUTO: 34 PG (ref 24–34)
MCHC RBC AUTO-ENTMCNC: 33.3 G/DL (ref 31–37)
MCV RBC AUTO: 102 FL (ref 74–97)
PLATELET # BLD AUTO: 106 K/UL (ref 135–420)
PMV BLD AUTO: 10.5 FL (ref 9.2–11.8)
POTASSIUM SERPL-SCNC: 4.7 MMOL/L (ref 3.5–5.5)
RBC # BLD AUTO: 3.5 M/UL (ref 4.7–5.5)
SERVICE CMNT-IMP: NORMAL
SODIUM SERPL-SCNC: 142 MMOL/L (ref 136–145)
WBC # BLD AUTO: 8.1 K/UL (ref 4.6–13.2)

## 2018-10-30 PROCEDURE — 65660000000 HC RM CCU STEPDOWN

## 2018-10-30 PROCEDURE — 80048 BASIC METABOLIC PNL TOTAL CA: CPT | Performed by: HOSPITALIST

## 2018-10-30 PROCEDURE — 74011250636 HC RX REV CODE- 250/636: Performed by: HOSPITALIST

## 2018-10-30 PROCEDURE — 74011000250 HC RX REV CODE- 250: Performed by: HOSPITALIST

## 2018-10-30 PROCEDURE — 74011250637 HC RX REV CODE- 250/637: Performed by: HOSPITALIST

## 2018-10-30 PROCEDURE — 85027 COMPLETE CBC AUTOMATED: CPT | Performed by: HOSPITALIST

## 2018-10-30 PROCEDURE — 77010033678 HC OXYGEN DAILY

## 2018-10-30 PROCEDURE — 93005 ELECTROCARDIOGRAM TRACING: CPT

## 2018-10-30 PROCEDURE — 36415 COLL VENOUS BLD VENIPUNCTURE: CPT | Performed by: HOSPITALIST

## 2018-10-30 RX ADMIN — CARBIDOPA AND LEVODOPA 1 TABLET: 25; 100 TABLET ORAL at 17:27

## 2018-10-30 RX ADMIN — SPIRONOLACTONE 25 MG: 25 TABLET ORAL at 09:50

## 2018-10-30 RX ADMIN — PANTOPRAZOLE SODIUM 40 MG: 40 TABLET, DELAYED RELEASE ORAL at 09:50

## 2018-10-30 RX ADMIN — Medication 10 ML: at 17:27

## 2018-10-30 RX ADMIN — Medication 10 ML: at 22:23

## 2018-10-30 RX ADMIN — CARBIDOPA AND LEVODOPA 1 TABLET: 25; 100 TABLET ORAL at 22:23

## 2018-10-30 RX ADMIN — ENOXAPARIN SODIUM 40 MG: 40 INJECTION SUBCUTANEOUS at 05:08

## 2018-10-30 RX ADMIN — Medication 81 MG: at 09:50

## 2018-10-30 RX ADMIN — TAMSULOSIN HYDROCHLORIDE 0.4 MG: 0.4 CAPSULE ORAL at 09:50

## 2018-10-30 RX ADMIN — WATER 2 G: 1 INJECTION INTRAMUSCULAR; INTRAVENOUS; SUBCUTANEOUS at 22:23

## 2018-10-30 RX ADMIN — CARBIDOPA AND LEVODOPA 1 TABLET: 25; 100 TABLET ORAL at 09:50

## 2018-10-30 RX ADMIN — SODIUM CHLORIDE 500 MG: 900 INJECTION, SOLUTION INTRAVENOUS at 05:08

## 2018-10-30 NOTE — PROGRESS NOTES
PT order acknowledged. At this time pt with compression fractures and awaiting Ortho consult.  Will follow up when medically appropriate,

## 2018-10-30 NOTE — PROGRESS NOTES
Transition of care TBD Met rupa hawley at bedside Dr. Enriqueta Jordan informed IDR team and patient he has compression FX. He will consult with ortho. Zahra Kim RN to gt patient back brace. Telephone call to Arlette Qiu at [facility] she informed cm that patient lives in assisted living at [place] and she is willing to look at this patient for rehab when ready will need pt and ot notes consults have been placed will send referral and pt and ot notes when notes are on emr.

## 2018-10-30 NOTE — PROGRESS NOTES
Shift progress Summary: 
Assumed care of patient in bed awake and quiet, no complaints offered. Incontinent of urine x2. Uneventful night VSS, call bell within reach. Patient Vitals for the past 12 hrs: 
 Temp Pulse Resp BP SpO2  
10/30/18 0320 99 °F (37.2 °C) (!) 50 20 133/56 94 % 10/29/18 2300 99.1 °F (37.3 °C) (!) 50 19 132/55 93 % 10/29/18 1927 98 °F (36.7 °C) (!) 57 17 111/80 98 %

## 2018-10-30 NOTE — PROGRESS NOTES
Hold OT eval for now per physician request as pt awaiting MRI for significant thoracic compression fracture. Will hold eval for now and re-attempt when pt appropriate.  Thank you Ailin Qiu OTR/L

## 2018-10-30 NOTE — PROGRESS NOTES
0730 Assumed care of pt from [place] [id number]. Pt resting quietly in bed , no signs of distress, call bell within reach. Shift Summary- Shift uneventful. Pt rested throughout shift. Denied chest pain or shortness of breath. Bedrest, but moved independently, sometimes incontinent to urine . Ate approximately 100% of all meals. Wound to the back of the head LEOPOLDO, slight drainage noted.

## 2018-10-31 LAB
ALBUMIN SERPL-MCNC: 2.9 G/DL (ref 3.4–5)
ALBUMIN/GLOB SERPL: 0.8 {RATIO} (ref 0.8–1.7)
ALP SERPL-CCNC: 72 U/L (ref 45–117)
ALT SERPL-CCNC: <6 U/L (ref 16–61)
ANION GAP SERPL CALC-SCNC: 9 MMOL/L (ref 3–18)
AST SERPL-CCNC: 19 U/L (ref 15–37)
BILIRUB SERPL-MCNC: 1.9 MG/DL (ref 0.2–1)
BUN SERPL-MCNC: 14 MG/DL (ref 7–18)
BUN/CREAT SERPL: 14
CALCIUM SERPL-MCNC: 9.1 MG/DL (ref 8.5–10.1)
CHLORIDE SERPL-SCNC: 108 MMOL/L (ref 100–108)
CO2 SERPL-SCNC: 25 MMOL/L (ref 21–32)
CREAT SERPL-MCNC: 1.02 MG/DL (ref 0.6–1.3)
ERYTHROCYTE [DISTWIDTH] IN BLOOD BY AUTOMATED COUNT: 13.7 % (ref 11.6–14.5)
GLOBULIN SER CALC-MCNC: 3.6 G/DL (ref 2–4)
GLUCOSE SERPL-MCNC: 75 MG/DL (ref 74–99)
HCT VFR BLD AUTO: 39.2 % (ref 36–48)
HGB BLD-MCNC: 13.2 G/DL (ref 13–16)
MAGNESIUM SERPL-MCNC: 2.2 MG/DL (ref 1.6–2.6)
MCH RBC QN AUTO: 34 PG (ref 24–34)
MCHC RBC AUTO-ENTMCNC: 33.7 G/DL (ref 31–37)
MCV RBC AUTO: 101 FL (ref 74–97)
PLATELET # BLD AUTO: 112 K/UL (ref 135–420)
PMV BLD AUTO: 10.1 FL (ref 9.2–11.8)
POTASSIUM SERPL-SCNC: 5 MMOL/L (ref 3.5–5.5)
PROT SERPL-MCNC: 6.5 G/DL (ref 6.4–8.2)
RBC # BLD AUTO: 3.88 M/UL (ref 4.7–5.5)
SODIUM SERPL-SCNC: 142 MMOL/L (ref 136–145)
WBC # BLD AUTO: 7.5 K/UL (ref 4.6–13.2)

## 2018-10-31 PROCEDURE — 74011250637 HC RX REV CODE- 250/637: Performed by: HOSPITALIST

## 2018-10-31 PROCEDURE — 80053 COMPREHEN METABOLIC PANEL: CPT | Performed by: INTERNAL MEDICINE

## 2018-10-31 PROCEDURE — 77010033678 HC OXYGEN DAILY

## 2018-10-31 PROCEDURE — 74011250636 HC RX REV CODE- 250/636: Performed by: HOSPITALIST

## 2018-10-31 PROCEDURE — 85027 COMPLETE CBC AUTOMATED: CPT | Performed by: INTERNAL MEDICINE

## 2018-10-31 PROCEDURE — 83735 ASSAY OF MAGNESIUM: CPT | Performed by: INTERNAL MEDICINE

## 2018-10-31 PROCEDURE — 36415 COLL VENOUS BLD VENIPUNCTURE: CPT | Performed by: INTERNAL MEDICINE

## 2018-10-31 PROCEDURE — 74011000250 HC RX REV CODE- 250: Performed by: HOSPITALIST

## 2018-10-31 PROCEDURE — 65660000000 HC RM CCU STEPDOWN

## 2018-10-31 RX ADMIN — CARBIDOPA AND LEVODOPA 1 TABLET: 25; 100 TABLET ORAL at 17:24

## 2018-10-31 RX ADMIN — WATER 2 G: 1 INJECTION INTRAMUSCULAR; INTRAVENOUS; SUBCUTANEOUS at 22:07

## 2018-10-31 RX ADMIN — PANTOPRAZOLE SODIUM 40 MG: 40 TABLET, DELAYED RELEASE ORAL at 08:44

## 2018-10-31 RX ADMIN — Medication 81 MG: at 08:44

## 2018-10-31 RX ADMIN — TAMSULOSIN HYDROCHLORIDE 0.4 MG: 0.4 CAPSULE ORAL at 08:44

## 2018-10-31 RX ADMIN — Medication 10 ML: at 22:08

## 2018-10-31 RX ADMIN — SODIUM CHLORIDE 500 MG: 900 INJECTION, SOLUTION INTRAVENOUS at 04:10

## 2018-10-31 RX ADMIN — Medication 10 ML: at 06:00

## 2018-10-31 RX ADMIN — CARBIDOPA AND LEVODOPA 1 TABLET: 25; 100 TABLET ORAL at 08:44

## 2018-10-31 RX ADMIN — SPIRONOLACTONE 25 MG: 25 TABLET ORAL at 08:44

## 2018-10-31 RX ADMIN — CARBIDOPA AND LEVODOPA 1 TABLET: 25; 100 TABLET ORAL at 22:08

## 2018-10-31 RX ADMIN — Medication 10 ML: at 17:24

## 2018-10-31 NOTE — PROGRESS NOTES
Problem: Falls - Risk of 
Goal: *Absence of Falls Document Pauloroxana Ned Fall Risk and appropriate interventions in the flowsheet. Outcome: Progressing Towards Goal 
Fall Risk Interventions: 
Mobility Interventions: PT Consult for assist device competence, Communicate number of staff needed for ambulation/transfer Mentation Interventions: Evaluate medications/consider consulting pharmacy, Bed/chair exit alarm Medication Interventions: Patient to call before getting OOB, Teach patient to arise slowly, Evaluate medications/consider consulting pharmacy Elimination Interventions: Bed/chair exit alarm, Call light in reach, Patient to call for help with toileting needs, Urinal in reach History of Falls Interventions: Consult care management for discharge planning, Bed/chair exit alarm, Evaluate medications/consider consulting pharmacy Problem: Pressure Injury - Risk of 
Goal: *Prevention of pressure injury Document Kd Scale and appropriate interventions in the flowsheet. Outcome: Progressing Towards Goal 
Pressure Injury Interventions: 
Sensory Interventions: Pressure redistribution bed/mattress (bed type), Turn and reposition approx. every two hours (pillows and wedges if needed) Moisture Interventions: Apply protective barrier, creams and emollients Activity Interventions: PT/OT evaluation, Pressure redistribution bed/mattress(bed type) Mobility Interventions: Pressure redistribution bed/mattress (bed type), PT/OT evaluation Nutrition Interventions: Offer support with meals,snacks and hydration, Document food/fluid/supplement intake Friction and Shear Interventions: HOB 30 degrees or less

## 2018-10-31 NOTE — PROGRESS NOTES
Bedside and Verbal shift change report given to Marjorie Saunders (oncoming nurse) by Girma Montgomery (offgoing nurse). Report included the following information SBAR, Kardex and MAR.

## 2018-10-31 NOTE — PROGRESS NOTES
PT erlin acknowledged. Ortho consult has been placed. Will await Ortho recommendations before mobilizing patient. Thank you.

## 2018-10-31 NOTE — PROGRESS NOTES
1930 Verbal bedside received from Mando Sandoval RN off going nurse. Assumed patient care, bed in low position, call light within reach, white board updated. 2100 Paged Dr. Alicia Shoemaker about patient change in heart rhythm to a flutter. Labs ordered and EKG done. 2209 Patients heart rate in the 180 not sustaining, patient having tremors and using urinal. No complains. 0020 Patients heart rate in the 30s, checked on patient, asymptomatic, sleeping. Patient arousable  with no complain. Went back to sleep. 36 Paged Dr. Prasanna Chavarria, and informed her of patients heart rate sustaining in the 30's. No orders given, cardiology consult in the am.  
 
0600 uneventful remaining part of the night.

## 2018-10-31 NOTE — ROUTINE PROCESS
Bedside and Verbal shift change report given to KARLOS Collier (oncoming nurse) by PATRICK Christie (offgoing nurse). Report included the following information SBAR, Kardex, Intake/Output and MAR.

## 2018-10-31 NOTE — CONSULTS
Consult Note    Patient: Sulma Law               Sex: male          DOA: 10/28/2018         YOB: 1940      Age:  [de-identified] y.o.        LOS:  LOS: 2 days              HPI:     Sulma Law is a [de-identified] y.o. male who has been seen for compression fractures. Nixon Adams 10/28/18 and sustained lacerations to the head. No real back pain, no change in ambulation status. Usually uses a cane as he has had balance issues in the past.  No LOC. No complaints of back pain now. No numbness, tingling in upper or lower extremities. Past Medical History:   Diagnosis Date    BPH (benign prostatic hyperplasia)     GERD (gastroesophageal reflux disease)     Hypertension     Osteopenia     [facility] ([facility]        History reviewed. No pertinent surgical history. History reviewed. No pertinent family history. Social History     Socioeconomic History    Marital status:      Spouse name: Not on file    Number of children: Not on file    Years of education: Not on file    Highest education level: Not on file   Social Needs    Financial resource strain: Not on file    Food insecurity - worry: Not on file    Food insecurity - inability: Not on file    Transportation needs - medical: Not on file   [email or web address] needs - non-medical: Not on file   Occupational History    Not on file   Tobacco Use    Smoking status: Former Smoker    Smokeless tobacco: Never Used   Substance and Sexual Activity    Alcohol use: No     Frequency: Never    Drug use: No    Sexual activity: Not Currently   Other Topics Concern    Not on file   Social History Narrative    Not on file       Prior to Admission medications    Medication Sig Start Date End Date Taking? Authorizing Provider   alendronate (FOSAMAX) 70 mg tablet Take  by mouth. Yes Other, MD Marlene   aspirin delayed-release 81 mg tablet Take  by mouth daily.    Yes Other, MD Marlene   carbidopa-levodopa (SINEMET)  mg per tablet Take 1 Tab by mouth three (3) times daily. Yes Belen, MD Marlene   pantoprazole (PROTONIX) 40 mg tablet Take 40 mg by mouth daily. Yes Belen, MD Marlene   Vit A,C,E-Zinc-Copper (PRESERVISION AREDS) cap capsule Take 1 Cap by mouth. Yes Marlene Glover MD   propranolol LA (INDERAL LA) 80 mg SR capsule Take  by mouth daily. Yes Marlene Glover MD   spironolactone (ALDACTONE) 25 mg tablet Take 25 mg by mouth daily. Yes Marlene Glover MD   tamsulosin (FLOMAX) 0.4 mg capsule Take 0.4 mg by mouth daily. Yes Marlene Glover MD   cyanocobalamin (VITAMIN B-12) 100 mcg tablet Take 2,500 mcg by mouth daily. Yes Marlene Glover MD   ergocalciferol (VITAMIN D2) 50,000 unit capsule Take 50,000 Units by mouth. Yes Marlene Glover MD   cholecalciferol (VITAMIN D3) 1,000 unit cap Take 2,000 Units by mouth daily. Yes Belen, MD Marlene       No Known Allergies    Review of Systems  A comprehensive review of systems was negative except for that written in the History of Present Illness. Physical Exam:      Visit Vitals  /78 (BP 1 Location: Right arm, BP Patient Position: At rest)   Pulse (!) 51   Temp 98.2 °F (36.8 °C)   Resp 16   Ht 5' 11\" (1.803 m)   Wt 78.5 kg (173 lb)   SpO2 97%   BMI 24.13 kg/m²       Physical Exam:  Physical Exam:   General:  Alert, cooperative, no distress, appears stated age. Eyes:  Conjunctivae/corneas clear. PERRL, EOMs intact. Fundi benign   Ears:  Normal TMs and external ear canals both ears. Nose: Nares normal. Septum midline. Mucosa normal. No drainage or sinus tenderness. Mouth/Throat: Lips, mucosa, and tongue normal. Teeth and gums normal.   Neck: Supple, symmetrical, trachea midline, no adenopathy, thyroid: no enlargement/tenderness/nodules, no carotid bruit and no JVD. Back:   Symmetric, no curvature. ROM normal. No CVA tenderness. Lungs:   Clear to auscultation bilaterally. Heart:  Regular rate and rhythm, S1, S2 normal, no murmur, click, rub or gallop. Abdomen:   Soft, non-tender.  Bowel sounds normal. No masses,  No organomegaly. Extremities: Extremities normal, atraumatic, no cyanosis or edema. Pulses: 2+ and symmetric all extremities. Skin: Skin color, texture, turgor normal. No rashes or lesions   Lymph nodes: Cervical, supraclavicular, and axillary nodes normal.   Neurologic: CNII-XII intact. Normal strength, sensation and reflexes throughout. Labs Reviewed:  CBC:   Lab Results   Component Value Date/Time    WBC 7.5 10/31/2018 04:37 AM    HGB 13.2 10/31/2018 04:37 AM    HCT 39.2 10/31/2018 04:37 AM     (L) 10/31/2018 04:37 AM        MRI: Chronic compression fracture T5, T8, T10 and T12. Early cord compression. Assessment/Plan     Active Problems:    Hypoxia (10/29/2018)      Parkinson's disease (HCC) ()      GERD (gastroesophageal reflux disease) ()      Hypertension ()      BPH (benign prostatic hyperplasia) ()      Acute cystitis without hematuria (10/29/2018)      Pneumonia (10/29/2018)      Compression fracture of body of thoracic vertebra ([address] (10/29/2018)        [de-identified] y.o. Male with chronic compression fractures. No signs of myelopathy/cord impingement. No need for further treatment orthopaedically.

## 2018-10-31 NOTE — PROGRESS NOTES
Transition of care: TBD Met with patient and Dr. Kaylen Bentley at bedside. Patient PT and OT has been on hold until patient has been cleared by ortho. Per note from Dr. Elida Hinton see below [de-identified] y.o. Male with chronic compression fractures. No signs of myelopathy/cord impingement. No need for further treatment orthopaedically. 
 patient lives in assisted living but due to falls and fx may need rehab patient is willing to go to rehab.  pateient will need 3 midnights as in patient and  He lives at assisted living at [facility] and cm has spoke with Ulysses Squires at [facility]. Patient will need pt and ot notes. Cm has placed referral for [facility] for report. Cm will continue to follow Care Management Interventions PCP Verified by CM: Yes Transition of Care Consult (CM Consult): Discharge Planning

## 2018-10-31 NOTE — PROGRESS NOTES
Occupational Therapy Evaluation/Treatment Attempt Chart reviewed. Attempted Occupational Therapy Evaluation/Treatment, however, patient unable to be seen due to: 
[]  Nausea/vomiting 
[]  Eating 
[]  Pain 
[]  Patient too lethargic 
[]  Off Unit for testing/procedure 
[]  Dialysis treatment in progress  
[]  Telemetry Results [x]  Other: Patient admitted w/ T12 compression fx w/ MRI showing multiple compression fx as well as C3-4 cord flattening. Will HOLD until Ortho consult completed with recommendations. Will continue to follow.   
Thank you for this referral. 
Unique Darden, OTR/L

## 2018-11-01 LAB
ALBUMIN SERPL-MCNC: 2.6 G/DL (ref 3.4–5)
ALBUMIN/GLOB SERPL: 0.8 {RATIO} (ref 0.8–1.7)
ALP SERPL-CCNC: 74 U/L (ref 45–117)
ALT SERPL-CCNC: <6 U/L (ref 16–61)
ANION GAP SERPL CALC-SCNC: 9 MMOL/L (ref 3–18)
AST SERPL-CCNC: 18 U/L (ref 15–37)
BASOPHILS # BLD: 0 K/UL (ref 0–0.1)
BASOPHILS NFR BLD: 0 % (ref 0–2)
BILIRUB SERPL-MCNC: 1.2 MG/DL (ref 0.2–1)
BUN SERPL-MCNC: 14 MG/DL (ref 7–18)
BUN/CREAT SERPL: 15
CALCIUM SERPL-MCNC: 9 MG/DL (ref 8.5–10.1)
CHLORIDE SERPL-SCNC: 108 MMOL/L (ref 100–108)
CO2 SERPL-SCNC: 25 MMOL/L (ref 21–32)
CREAT SERPL-MCNC: 0.91 MG/DL (ref 0.6–1.3)
DIFFERENTIAL METHOD BLD: ABNORMAL
EOSINOPHIL # BLD: 0.4 K/UL (ref 0–0.4)
EOSINOPHIL NFR BLD: 6 % (ref 0–5)
ERYTHROCYTE [DISTWIDTH] IN BLOOD BY AUTOMATED COUNT: 13.3 % (ref 11.6–14.5)
GLOBULIN SER CALC-MCNC: 3.4 G/DL (ref 2–4)
GLUCOSE SERPL-MCNC: 79 MG/DL (ref 74–99)
HCT VFR BLD AUTO: 36.6 % (ref 36–48)
HGB BLD-MCNC: 12.4 G/DL (ref 13–16)
LYMPHOCYTES # BLD: 2.7 K/UL (ref 0.9–3.6)
LYMPHOCYTES NFR BLD: 37 % (ref 21–52)
MAGNESIUM SERPL-MCNC: 2.1 MG/DL (ref 1.6–2.6)
MCH RBC QN AUTO: 34 PG (ref 24–34)
MCHC RBC AUTO-ENTMCNC: 33.9 G/DL (ref 31–37)
MCV RBC AUTO: 100.3 FL (ref 74–97)
MONOCYTES # BLD: 1 K/UL (ref 0.05–1.2)
MONOCYTES NFR BLD: 13 % (ref 3–10)
NEUTS SEG # BLD: 3.2 K/UL (ref 1.8–8)
NEUTS SEG NFR BLD: 44 % (ref 40–73)
PLATELET # BLD AUTO: 121 K/UL (ref 135–420)
PMV BLD AUTO: 10.3 FL (ref 9.2–11.8)
POTASSIUM SERPL-SCNC: 4.3 MMOL/L (ref 3.5–5.5)
PROT SERPL-MCNC: 6 G/DL (ref 6.4–8.2)
RBC # BLD AUTO: 3.65 M/UL (ref 4.7–5.5)
SODIUM SERPL-SCNC: 142 MMOL/L (ref 136–145)
WBC # BLD AUTO: 7.3 K/UL (ref 4.6–13.2)

## 2018-11-01 PROCEDURE — 74011250637 HC RX REV CODE- 250/637: Performed by: HOSPITALIST

## 2018-11-01 PROCEDURE — 85025 COMPLETE CBC W/AUTO DIFF WBC: CPT | Performed by: INTERNAL MEDICINE

## 2018-11-01 PROCEDURE — 74011250636 HC RX REV CODE- 250/636: Performed by: HOSPITALIST

## 2018-11-01 PROCEDURE — 83735 ASSAY OF MAGNESIUM: CPT | Performed by: INTERNAL MEDICINE

## 2018-11-01 PROCEDURE — 65660000000 HC RM CCU STEPDOWN

## 2018-11-01 PROCEDURE — 74011000250 HC RX REV CODE- 250: Performed by: HOSPITALIST

## 2018-11-01 PROCEDURE — 97167 OT EVAL HIGH COMPLEX 60 MIN: CPT

## 2018-11-01 PROCEDURE — 97162 PT EVAL MOD COMPLEX 30 MIN: CPT

## 2018-11-01 PROCEDURE — 36415 COLL VENOUS BLD VENIPUNCTURE: CPT | Performed by: INTERNAL MEDICINE

## 2018-11-01 PROCEDURE — 80053 COMPREHEN METABOLIC PANEL: CPT | Performed by: INTERNAL MEDICINE

## 2018-11-01 RX ORDER — AZITHROMYCIN 500 MG/1
500 TABLET, FILM COATED ORAL DAILY
Qty: 3 TAB | Refills: 0 | Status: SHIPPED
Start: 2018-11-01 | End: 2018-11-04

## 2018-11-01 RX ADMIN — Medication 81 MG: at 09:22

## 2018-11-01 RX ADMIN — Medication 10 ML: at 06:00

## 2018-11-01 RX ADMIN — CARBIDOPA AND LEVODOPA 1 TABLET: 25; 100 TABLET ORAL at 16:59

## 2018-11-01 RX ADMIN — Medication 10 ML: at 22:00

## 2018-11-01 RX ADMIN — CARBIDOPA AND LEVODOPA 1 TABLET: 25; 100 TABLET ORAL at 22:30

## 2018-11-01 RX ADMIN — WATER 2 G: 1 INJECTION INTRAMUSCULAR; INTRAVENOUS; SUBCUTANEOUS at 22:30

## 2018-11-01 RX ADMIN — SODIUM CHLORIDE 500 MG: 900 INJECTION, SOLUTION INTRAVENOUS at 04:00

## 2018-11-01 RX ADMIN — TAMSULOSIN HYDROCHLORIDE 0.4 MG: 0.4 CAPSULE ORAL at 09:22

## 2018-11-01 RX ADMIN — CARBIDOPA AND LEVODOPA 1 TABLET: 25; 100 TABLET ORAL at 09:22

## 2018-11-01 RX ADMIN — PANTOPRAZOLE SODIUM 40 MG: 40 TABLET, DELAYED RELEASE ORAL at 09:22

## 2018-11-01 RX ADMIN — SPIRONOLACTONE 25 MG: 25 TABLET ORAL at 09:22

## 2018-11-01 RX ADMIN — Medication 10 ML: at 17:00

## 2018-11-01 NOTE — PROGRESS NOTES
Cardiology Progress Note Patient: Karina Smiley        Sex: male          DOA: 10/28/2018 YOB: 1940      Age:  [de-identified] y.o.        LOS:  LOS: 3 days Patient seen and examined, chart reviewed. Assessment/Plan Patient Active Problem List  
Diagnosis Code  Hypoxia R09.02  
 Parkinson's disease ([facility] [address] G20  
 GERD (gastroesophageal reflux disease) K21.9  Hypertension I10  
 BPH (benign prostatic hyperplasia) N40.0  Acute cystitis without hematuria N30.00  Pneumonia J18.9  Compression fracture of body of thoracic vertebra (HCC) S22.000A Bradycardia - average heart rate is over 50 bpm  
 
Plan: No AV ricardo blocking agent Continue management as per hospital medicine Cardiology sign off Subjective:  
 cc: 
Denies any chest pain or shortness of breath REVIEW OF SYSTEMS:  
 
General: No fevers or chills. Cardiovascular: No chest pain,No palpitations, No orthopnea, No PND, No leg swelling, No claudication Pulmonary: No dyspnea Gastrointestinal: No nausea, vomiting, bleeding Neurology: No Dizziness Objective:  
  
Visit Vitals /50 (BP 1 Location: Left arm, BP Patient Position: At rest) Pulse (!) 57 Temp 98.1 °F (36.7 °C) Resp 16 Ht 5' 11\" (1.803 m) Wt 77.8 kg (171 lb 8 oz) SpO2 96% BMI 23.92 kg/m² Body mass index is 23.92 kg/m². Physical Exam: 
General Appearance: Comfortable, not using accessory muscles of respiration. HEENT: CARMEN. HEAD: Atraumatic NECK: No JVD, no thyroidomeglay. CAROTIDS: No bruit LUNGS: Clear bilaterally. HEART: S1+S2 audible, no murmur, no pericardial rub. NEUROLOGICAL: Alert, awake, follows verbal commands Medication: 
Current Facility-Administered Medications Medication Dose Route Frequency  influenza vaccine 2018-19 (6 mos+)(PF) (FLUARIX QUAD/FLULAVAL QUAD) injection 0.5 mL  0.5 mL IntraMUSCular PRIOR TO DISCHARGE  
  aspirin delayed-release tablet 81 mg  81 mg Oral DAILY  carbidopa-levodopa (SINEMET)  mg per tablet 1 Tab  1 Tab Oral TID  pantoprazole (PROTONIX) tablet 40 mg  40 mg Oral DAILY  spironolactone (ALDACTONE) tablet 25 mg  25 mg Oral DAILY  tamsulosin (FLOMAX) capsule 0.4 mg  0.4 mg Oral DAILY  sodium chloride (NS) flush 5-10 mL  5-10 mL IntraVENous Q8H  
 sodium chloride (NS) flush 5-10 mL  5-10 mL IntraVENous PRN  
 enoxaparin (LOVENOX) injection 40 mg  40 mg SubCUTAneous Q24H  
 acetaminophen (TYLENOL) tablet 650 mg  650 mg Oral Q6H PRN  
 cefTRIAXone (ROCEPHIN) 2 g in sterile water (preservative free) 20 mL IV syringe  2 g IntraVENous Q24H  
 azithromycin (ZITHROMAX) 500 mg in 0.9% sodium chloride 250 mL IVPB  500 mg IntraVENous Q24H Lab/Data Reviewed: 
 
  
Recent Labs 11/01/18 
0220 10/31/18 
8777 10/30/18 
1084 WBC 7.3 7.5 8.1 HGB 12.4* 13.2 11.9*  
HCT 36.6 39.2 35.7*  
* 112* 106* Recent Labs 11/01/18 
0220 10/31/18 
6578 10/30/18 
7416  142 142  
K 4.3 5.0 4.7  108 108 CO2 25 25 25 GLU 79 75 84 BUN 14 14 19* CREA 0.91 1.02 1.13  
CA 9.0 9.1 8.9 Signed By: Magalys Benavides MD   
 November 1, 2018

## 2018-11-01 NOTE — PROGRESS NOTES
722:  Assumed care for patient, received bedside report from Norah Llanes RN. Patient lying in bed quietly resting, with no complaint of pain or discomfort at the time. Bed at the lowest position with call bell within reach. Bed alarm activated. Shift uneventful. Bedside and Verbal shift change report given to Max Pina RN (oncoming nurse) by Neo Dejesus RN 
 (offgoing nurse). Report included the following information SBAR, Kardex, ED Summary, Procedure Summary, Intake/Output, MAR, Med Rec Status, Cardiac Rhythm SB with 1AVB and Alarm Parameters .

## 2018-11-01 NOTE — DISCHARGE SUMMARY
Discharge Summary    Patient: Sajan Garcia MRN: [id number]  CSN: [id number]    YOB: 1940  Age: [de-identified] y.o. Sex: male    DOA: 10/28/2018 LOS:  LOS: 3 days   Discharge Date:      Primary Care Provider:  Other, MD Marlene    Admission Diagnoses: Hypoxia    Discharge Diagnoses:    Problem List as of 11/1/2018 Date Reviewed: 10/30/2018          Codes Class Noted - Resolved    Hypoxia ICD-10-CM: R09.02  ICD-9-CM: 799.02  10/29/2018 - Present        Parkinson's disease ([facility] ICD-10-CM: G20  ICD-9-CM: 332.0  Unknown - Present        GERD (gastroesophageal reflux disease) ICD-10-CM: K21.9  ICD-9-CM: 530.81  Unknown - Present        Hypertension ICD-10-CM: I10  ICD-9-CM: 401.9  Unknown - Present        BPH (benign prostatic hyperplasia) ICD-10-CM: N40.0  ICD-9-CM: 600.00  Unknown - Present        Acute cystitis without hematuria ICD-10-CM: N30.00  ICD-9-CM: 595.0  10/29/2018 - Present        Pneumonia ICD-10-CM: J18.9  ICD-9-CM: 012  10/29/2018 - Present        Compression fracture of body of thoracic vertebra ([facility] ICD-10-CM: S22.000A  ICD-9-CM: 805.2  10/29/2018 - Present              Discharge Medications:     Current Discharge Medication List      START taking these medications    Details   azithromycin (ZITHROMAX) 500 mg tab Take 1 Tab by mouth daily for 3 days. Qty: 3 Tab, Refills: 0         CONTINUE these medications which have NOT CHANGED    Details   alendronate (FOSAMAX) 70 mg tablet Take  by mouth. aspirin delayed-release 81 mg tablet Take  by mouth daily. carbidopa-levodopa (SINEMET)  mg per tablet Take 1 Tab by mouth three (3) times daily. pantoprazole (PROTONIX) 40 mg tablet Take 40 mg by mouth daily. Vit A,C,E-Zinc-Copper (PRESERVISION AREDS) cap capsule Take 1 Cap by mouth. spironolactone (ALDACTONE) 25 mg tablet Take 25 mg by mouth daily. tamsulosin (FLOMAX) 0.4 mg capsule Take 0.4 mg by mouth daily.       cyanocobalamin (VITAMIN B-12) 100 mcg tablet Take 2,500 mcg by mouth daily. ergocalciferol (VITAMIN D2) 50,000 unit capsule Take 50,000 Units by mouth. cholecalciferol (VITAMIN D3) 1,000 unit cap Take 2,000 Units by mouth daily. STOP taking these medications       propranolol LA (INDERAL LA) 80 mg SR capsule Comments:   Reason for Stopping:               Discharge Condition: Good        Consults: Cardiothoracic Surgery and Orthopedics      PHYSICAL EXAM   Visit Vitals  /50 (BP 1 Location: Left arm, BP Patient Position: At rest)   Pulse (!) 50   Temp 98 °F (36.7 °C)   Resp 16   Ht 5' 11\" (1.803 m)   Wt 77.8 kg (171 lb 8 oz)   SpO2 98%   BMI 23.92 kg/m²     General: Awake, cooperative, no acute distress    HEENT: NC, Atraumatic. PERRLA, EOMI. Anicteric sclerae. Lungs:  CTA Bilaterally. No Wheezing/Rhonchi/Rales. Heart:  Regular  rhythm,  No murmur, No Rubs, No Gallops  Abdomen: Soft, Non distended, Non tender. +Bowel sounds,   Extremities: No c/c/e  Psych:   Not anxious or agitated. Neurologic:  No acute neurological deficits. Admission HPI : Rivera Leach is a [de-identified] y.o. male who has past history of parkinsons, HTN, BPH, GERD is brought to ER with concerns of fall, patient lives at assisted living, he reports that he was getting up to go to lunch and he realized he forgot his cane and he turned around to get his cane and he fell. He is brought to ER. Patient as such denies any complains except for urinary incontinence. He was evaluated in ER and he was noted to have laceration on the back of his head. His head CT with no acute findings. He is noted to be hypoxic with oxygen saturations in 80s. His CXR with no acute changes. His D-Dimer checked and it was elevated at >20. He had CT chest that did not show evidence of PE, showed Lower lung field interstitial coarsening may be chronic versus bronchitis/pneumonitis. His UA with evidence of UTI.         Hospital Course :   Patient admitted to telemetry, he did not had any acute events. Hypoxia - CT scan chest showed no evidence of PE, Lower lung field interstitial coarsening may be chronic versus bronchitis/pneumonitis. He was put on incentive spirometry, oxygen by NC as needed, he was weaned off oxygen and he is saturation above 93% on RA.        Pneumonia - started on ceftriaxone and azithromycin, received for 4 days, will discharge on azithromycin to complete 7 day course.      UTI - antibiotics as above, no growth on urine cultures.      HTN - controlled on home medications. Bradycardia - he had his HR down in 40s, cardiology consulted, rhythm with sinus bradycardia, he is on propranolol PTA. Will stop this.      Parkinson's disease - continue levadopa carbidopa     BPH - on flomax     GERD - continue with PPI     Compression fracture - thoracic spine, no back pain. PT/OT  Ortho consulted, no indication for surgery. He worked with PT/OT and rehab recommended. Activity: Activity as tolerated    Diet: Regular Diet    Follow-up: PCP    Disposition: rehab    Minutes spent on discharge: 45       Labs: Results:       Chemistry Recent Labs     11/01/18  0220 10/31/18  0437 10/30/18  0325   GLU 79 75 84    142 142   K 4.3 5.0 4.7    108 108   CO2 25 25 25   BUN 14 14 19*   CREA 0.91 1.02 1.13   CA 9.0 9.1 8.9   AGAP 9 9 9   BUCR 15 14 17   AP 74 72  --    TP 6.0* 6.5  --    ALB 2.6* 2.9*  --    GLOB 3.4 3.6  --    AGRAT 0.8 0.8  --       CBC w/Diff Recent Labs     11/01/18  0220 10/31/18  0437 10/30/18  0325   WBC 7.3 7.5 8.1   RBC 3.65* 3.88* 3.50*   HGB 12.4* 13.2 11.9*   HCT 36.6 39.2 35.7*   * 112* 106*   GRANS 44  --   --    LYMPH 37  --   --    EOS 6*  --   --       Cardiac Enzymes No results for input(s): CPK, CKND1, PARK in the last 72 hours. No lab exists for component: CKRMB, TROIP   Coagulation No results for input(s): PTP, INR, APTT in the last 72 hours.     No lab exists for component: INREXT    Lipid Panel No results found for: CHOL, CHOLPOCT, CHOLX, CHLST, CHOLV, 288224, HDL, LDL, LDLC, DLDLP, 364227, VLDLC, VLDL, TGLX, TRIGL, TRIGP, TGLPOCT, CHHD, CHHDX   BNP No results for input(s): BNPP in the last 72 hours. Liver Enzymes Recent Labs     11/01/18  0220   TP 6.0*   ALB 2.6*   AP 74   SGOT 18      Thyroid Studies No results found for: T4, T3U, TSH, TSHEXT         Significant Diagnostic Studies: Mri Thorac Spine W Wo Cont    Result Date: 10/30/2018  EXAM: Thoracic spine MRI with gadolinium CLINICAL INDICATION/HISTORY: Spinal cord injury; T12 cord encroachment   > Additional: T12 compression fracture with retropulsion and suggestion of cord deformity on recent CT COMPARISON: None. > Reference Exam: Correlation CTA chest 10/28/2018 TECHNIQUE: Sagittal spin echo T1, KAVIN T2 and proton density sequences, sagittal STIR sequences, and selected angled axial sequences through the discs with spin echo T1 and T2*GRE sequences were obtained of the thoracic spine. Post gadolinium sagittal and axial T1 weighted sequences were also obtained. _______________ FINDINGS: There is severe chronic appearing compression fracture T12 vertebral body, vertebral plana, with retropulsion which causes asymmetric right anterior flattening of the cord in an otherwise capacious appearing canal, with no abnormal cord signal. Additional severe chronic compression fractures T5, T8, and T10 vertebral bodies without significant retropulsion. Additional mild chronic concave endplate deformities and mild loss of height T1-T4. Incidental intraosseous hemangioma T6 vertebral body. No evidence of marrow edema or neoplastic marrow signal. No evidence of cord enlargement or intrinsic cord signal abnormality. No abnormal enhancing lesion. Mild degenerative spondylosis T4/5 mildly flattening right anterior cord without significant stenosis. Small right posterior disc protrusion T8/9 without cord deformity or significant stenosis. Very small bilateral pleural effusions.  sequence shows multilevel degenerative cervical spondylosis, with mild retrolisthesis C3/4 with degenerative spondylosis, cord flattening, and at least moderate appearing canal stenosis. _______________     IMPRESSION: 1. Chronic vertebral plana T12 with retropulsion with cord deformity with no high-grade stenosis or intrinsic cord signal abnormality. 2. Additional multilevel chronic thoracic compression fractures as above, most severe loss of height T5, T8, and T10. 3. No acute osseous finding. 4. Areas of degenerative spondylosis as above but no high-grade stenosis or impingement. 5. At least moderate acquired canal stenosis and cord flattening C3/4. If patient has symptoms related to cervical myelopathy, this could be further evaluated with MRI dedicated to the cervical spine. Ct Head Wo Cont    Result Date: 10/28/2018  EXAM: CT HEAD W/O CONTRAST INDICATION: Closed head injury, fall COMPARISON: No prior comparison study. TECHNIQUE: Axial CT imaging of the head was performed without intravenous contrast.  Additional coronal and sagittal reconstructions were performed. One or more dose reduction techniques were used on this CT: automated exposure control, adjustment of the mAs and/or kVp according to patient's size, and iterative reconstruction techniques. The specific techniques utilized on this CT exam have been documented in the patient's electronic medical record. _______________ FINDINGS: VENTRICLES/EXTRA-AXIAL SPACES: The ventricles and sulci are mildly enlarged consistent with diffuse volume loss. BRAIN PARENCHYMA: There is no evidence of acute intracranial hemorrhage, mass effect, midline shift, or herniation. No definite CT evidence of acute cortical infarct is seen. A mild amount of hypodense white matter lesions are seen within the periventricular and subcortical white matter which are nonspecific, but likely represent chronic small vessel changes.  ORBITS: The bilateral lenses are absent, likely due to prior cataract surgery. PARANASAL SINUSES/MASTOIDS: Visualized paranasal sinuses are clear. Visualized mastoid air cells are clear. OSSEOUS STRUCTURES: Deformity of the right nasal bone is present. No overlying soft tissue swelling is seen. OTHER: Increased attenuation soft tissue swelling is present in the parieto-occipital region of the scalp representing contusion. Additional punctate foci of gas are present compatible with laceration. No underlying calvarial fracture is seen. Atherosclerotic calcifications are present. _______________     IMPRESSION: 1. No acute intracranial hemorrhage, mass effect, midline shift, or herniation. No definite CT evidence of acute cortical infarct is seen. Please note that noncontrast head CT may be normal in early acute infarct. 2. Parieto-occipital scalp contusion and laceration. No evidence of underlying calvarial fracture. 3. Mild nonspecific white matter disease likely representing chronic small vessel changes. 4. Age-indeterminate right nasal bone fracture, suspect chronic given lack of visualized associated soft tissue contusion. No prior comparison study. Clinical correlation recommended. Cta Chest W Or W Wo Cont    Result Date: 10/29/2018  EXAM: CTA chest INDICATION: Pain. Elevated d-dimer. COMPARISON: None TECHNIQUE: Axial CT imaging from the thoracic inlet through the diaphragm with intravenous contrast. Coronal and sagittal MIP reformats were generated. Dose reduction techniques used: automated exposure control, adjustment of the mAs and/or kVp according to patient size, and iterative reconstruction techniques. _______________ FINDINGS: EXAM QUALITY: Adequate. PULMONARY ARTERIES: No evidence of pulmonary embolism. MEDIASTINUM: Normal heart size. No evidence of right heart strain. Aorta is unremarkable. No pericardial effusion. LUNGS: There is lower lung field interstitial coarsening. There is no focal consolidation.   PLEURA: There is lung base pleural thickening. There is no pneumothorax or pleural effusion AIRWAY: Normal. LYMPH NODES: No enlarged nodes. UPPER ABDOMEN: There is a small hiatal hernia. OTHER: There are significant compression fractures of T5, T8 and T10. There is a significant compression fracture of T12 with significant retropulsion with significant spinal canal narrowing encroaching on the spinal cord. IMPRESSION: 1. No evidence of pulmonary embolism. 2.  Lower lung field interstitial coarsening may be chronic versus bronchitis/pneumonitis. 3. Significant compression fractures of T5, T8 and T10. 4. Significant T12 compression fracture with significant retropulsion with resultant significant spinal cord narrowing encroaching on the spinal cord. Early cord compression is considered. Findings were discussed with referring physician just prior to signing report. Xr Chest Port    Result Date: 10/29/2018  EXAM: One-view chest CLINICAL HISTORY: hypoxia , COMPARISON: None FINDINGS: Frontal view of the chest demonstrate minor streaky opacities in the left lung base. Lungs otherwise clear. . Cardiac silhouette is normal in size and contour. No acute bony or soft tissue abnormality. IMPRESSION: Minor streakiness in the left lung base, favor minor atelectasis and/or scarring. No acute process otherwise identified. No results found for this or any previous visit.         CC: Belen, MD Marlene

## 2018-11-01 NOTE — PROGRESS NOTES
Problem: Mobility Impaired (Adult and Pediatric) Goal: *Acute Goals and Plan of Care (Insert Text) Physical Therapy Goals Initiated 11/1/2018 and to be accomplished within 3-7 day(s) 1. Patient will move from supine to sit and sit to supine  in bed with supervision/set-up. 2.  Patient will transfer from bed to chair and chair to bed with supervision/set-up using the least restrictive device. 3.  Patient will perform sit to stand with supervision/set-up. 4.  Patient will ambulate with supervision/set-up for 150 feet with the least restrictive device. Outcome: Progressing Towards Goal 
physical Therapy EVALUATION Patient: Deion Cruz (1 y.o. male) Date: 11/1/2018 Primary Diagnosis: Hypoxia Precautions:   Fall, WBAT 
 
ASSESSMENT : 
Based on the objective data described below, the patient presents with lower extremity weakness, decreased gait quality and endurance, impaired bed mobility and transfers, and overall limitations in functional mobility s/p thoracic compression. Per ortho pt cleared for mobility and no plans for surgical intervention. Pt performed supine to sidelying to sit with ModA, sit to stand with ModA. Patient ambulated 4 side steps with RW, GB applied, ModA. Pt tolerated session fairly as evidenced by R hip/lower back pain with mobility, no lightheadedness or dizziness. Patient would benefit from skilled inpatient physical therapy to address deficits, progress as tolerated to achieve long term goals and allow safe discharge. Pt would benefit from rehab placement upon d/c to restore prior level of function and return to assisted living at maximum level of safety. Patient will benefit from skilled intervention to address the above impairments. Patients rehabilitation potential is considered to be Fair Factors which may influence rehabilitation potential include:  
[]         None noted 
[]         Mental ability/status [x]         Medical condition []         Home/family situation and support systems 
[]         Safety awareness [x]         Pain tolerance/management 
[]         Other: PLAN : 
Recommendations and Planned Interventions: 
[x]           Bed Mobility Training             [x]    Neuromuscular Re-Education 
[x]           Transfer Training                   []    Orthotic/Prosthetic Training 
[x]           Gait Training                          []    Modalities [x]           Therapeutic Exercises          []    Edema Management/Control 
[x]           Therapeutic Activities            [x]    Patient and Family Training/Education 
[]           Other (comment): Frequency/Duration: Patient will be followed by physical therapy 1-2 times per day to address goals. Discharge Recommendations: Petey Stevens Further Equipment Recommendations for Discharge: N/A  
 
SUBJECTIVE:  
Patient stated I am doing ok.  OBJECTIVE DATA SUMMARY:  
 
Past Medical History:  
Diagnosis Date  BPH (benign prostatic hyperplasia)  GERD (gastroesophageal reflux disease)  Hypertension  Osteopenia  Parkinson's disease ([facility] [address] History reviewed. No pertinent surgical history. Barriers to Learning/Limitations: yes;  cognitive Compensate with: Visual Cues, Verbal Cues and Tactile Cues Prior Level of Function/Home Situation: Amb c/SPC/RW Home Situation Home Environment: Assisted living Care Facility Name: [address] [place] [address]) One/Two Story Residence: One story Living Alone: No 
Support Systems: Assisted living Patient Expects to be Discharged to[de-identified] Rehabilitation facility Current DME Used/Available at Home: Cane, straight, Walker, rolling Tub or Shower Type: ShowerCritical Behavior: 
Neurologic State: Alert Orientation Level: Oriented X4 Cognition: Follows commands Safety/Judgement: Decreased awareness of need for assistance;Decreased insight into deficits Psychosocial 
Patient Behaviors: Calm; Cooperative Purposeful Interaction: Yes 
 Pt Identified Daily Priority: Clinical issues (comment) Garrett Process: Nurture loving kindness;Establish trust;Teaching/learning; Attend basic human needs;Create healing environment Caring Interventions: Reassure; Therapeutic modalities Reassure: Therapeutic listening; Acceptance; Informing;Caring rounds Therapeutic Modalities: Intentional therapeutic touch;HumorSkin Condition/Temp: Dry;Warm;Fragile Skin Integrity: Wound (add Wound LDA)(posterior head) Skin Integumentary Skin Color: Appropriate for ethnicity Skin Condition/Temp: Dry;Warm;Fragile Skin Integrity: Wound (add Wound LDA)(posterior head) Turgor: Non-tenting Hair Growth: Present Varicosities: Absent Strength:   
Strength: Generally decreased, functional 
 Tone & Sensation:  
Tone: Normal 
Sensation: Impaired Range Of Motion: 
AROM: Generally decreased, functional 
PROM: Generally decreased, functional 
 Functional Mobility: 
Bed Mobility: 
Supine to Sit: Moderate assistance Sit to Supine: Moderate assistance Scooting: Stand-by assistance Transfers: 
Sit to Stand: Moderate assistance Stand to Sit: Moderate assistance Balance:  
Sitting: Intact; With support;High guard Standing: Impaired; With support Standing - Static: Fair Standing - Dynamic : FairAmbulation/Gait Training: 
Distance (ft): 4 Feet (ft)(side steps) Assistive Device: Gait belt;Walker, rolling Ambulation - Level of Assistance: Moderate assistance Gait Description (WDL): Exceptions to [facility] Gait Abnormalities: Decreased step clearance; Antalgic; Path deviations; Step to gait Base of Support: Narrowed Speed/Suri: Shuffled; Slow Step Length: Right shortened;Left shortened Pain: 
Pain Scale 1: Numeric (0 - 10) Pain Intensity 1: 0 Activity Tolerance:  
Fair Please refer to the flowsheet for vital signs taken during this treatment. After treatment:  
[]         Patient left in no apparent distress sitting up in chair 
[x]         Patient left in no apparent distress in bed [x]         Call bell left within reach [x]         Nursing notified 
[]         Caregiver present [x]         Bed alarm activated COMMUNICATION/EDUCATION:  
[x]         Fall prevention education was provided and the patient/caregiver indicated understanding. [x]         Patient/family have participated as able in goal setting and plan of care. [x]         Patient/family agree to work toward stated goals and plan of care. []         Patient understands intent and goals of therapy, but is neutral about his/her participation. []         Patient is unable to participate in goal setting and plan of care. Thank you for this referral. 
Tucker Medrano Time Calculation: 15 mins Eval Complexity: History: MEDIUM  Complexity : 1-2 comorbidities / personal factors will impact the outcome/ POC Exam:MEDIUM Complexity : 3 Standardized tests and measures addressing body structure, function, activity limitation and / or participation in recreation  Presentation: MEDIUM Complexity : Evolving with changing characteristics  Clinical Decision Making:Medium Complexity amb <30 ft, ModA for mobility Overall Complexity:MEDIUM Mobility  Current  CL= 60-79%   Goal  CJ= 20-39%. The severity rating is based on the Level of Assistance required for Functional Mobility and ADLs.

## 2018-11-01 NOTE — CONSULTS
[facility] Consult Note      Patient: Beba Amezcua MRN: [id number]  SSN: [id number]    YOB: 1940  Age: [de-identified] y.o. Sex: male    Date of Consultation: 10/31/2018  Referring Physician: Vincent Perez MD  Reason for Consultation: Bradycardia     Chief complain: fall    HPI: 78-year-old gentleman admitted with fall. Cardiologist consult called for bradycardia. Patient denies any dizziness, palpitation, presyncope or syncope. He had fall but denies any episode of loss of consciousness. He denies any chest pain or shortness of breath on exertion. Past Medical History:   Diagnosis Date    BPH (benign prostatic hyperplasia)     GERD (gastroesophageal reflux disease)     Hypertension     Osteopenia     Parkinson's disease ([address]      History reviewed. No pertinent surgical history. Current Facility-Administered Medications   Medication Dose Route Frequency    influenza vaccine 2018-19 (6 mos+)(PF) (FLUARIX QUAD/FLULAVAL QUAD) injection 0.5 mL  0.5 mL IntraMUSCular PRIOR TO DISCHARGE    aspirin delayed-release tablet 81 mg  81 mg Oral DAILY    carbidopa-levodopa (SINEMET)  mg per tablet 1 Tab  1 Tab Oral TID    pantoprazole (PROTONIX) tablet 40 mg  40 mg Oral DAILY    spironolactone (ALDACTONE) tablet 25 mg  25 mg Oral DAILY    tamsulosin (FLOMAX) capsule 0.4 mg  0.4 mg Oral DAILY    sodium chloride (NS) flush 5-10 mL  5-10 mL IntraVENous Q8H    sodium chloride (NS) flush 5-10 mL  5-10 mL IntraVENous PRN    enoxaparin (LOVENOX) injection 40 mg  40 mg SubCUTAneous Q24H    acetaminophen (TYLENOL) tablet 650 mg  650 mg Oral Q6H PRN    cefTRIAXone (ROCEPHIN) 2 g in sterile water (preservative free) 20 mL IV syringe  2 g IntraVENous Q24H    azithromycin (ZITHROMAX) 500 mg in 0.9% sodium chloride 250 mL IVPB  500 mg IntraVENous Q24H       Allergies and Intolerances:   No Known Allergies    Family History:   History reviewed. No pertinent family history.     Social History:   He  reports that he has quit smoking. he has never used smokeless tobacco.  He  reports that he does not drink alcohol. Review of Systems:     Gen: No fever, chills, malaise, weight loss/gain. Heent: No headache, rhinorrhea, epistaxis, ear pain, hearing loss, sinus pain, neck pain/stiffness, sore throat. Heart: No chest pain, palpitations, shortness of breath on exertion, pnd, or orthopnea. Resp: No cough, hemoptysis, wheezing and dyspnea   GI: No nausea, vomiting, diarrhea, constipation, melena or hematochezia. : No urinary obstruction, dysuria or hematuria. Derm: No rash, new skin lesion or pruritis. Musc/skeletal: Positive bone or joint complains. Vasc: No edema, cyanosis or claudication. Endo: No heat/cold intolerance, no polyuria,polydipsia or polyphagia. Neuro: No unilateral weakness, numbness, tingling. No seizures. Heme: No easy bruising or bleeding. Physical:   Patient Vitals for the past 6 hrs:   Temp Pulse Resp BP SpO2   10/31/18 1942 97.3 °F (36.3 °C) (!) 59 16 115/45 97 %         Exam:   General Appearance: Comfortable, not using accessory muscles of respiration. HEENT: CARMEN. HEAD: Atraumatic  NECK: No JVD, no thyroidomeglay. CAROTIDS:no bruit  LUNGS: Clear bilaterally. HEART: S1+S2 audible, no murmur, no pericardial rub. ABD: Non-tender, BS Audible    EXT: No edema, and no cyanosis. VASCULAR EXAM: Pulses are intact. PSYCHIATRIC EXAM: Mood is appropriate.   NEUROLOGICAL: AAO times 3, resting tremor, Motor and sensory sytem intact     Review of Data:   LABS:   Lab Results   Component Value Date/Time    WBC 7.5 10/31/2018 04:37 AM    HGB 13.2 10/31/2018 04:37 AM    HCT 39.2 10/31/2018 04:37 AM    PLATELET 789 (L) 58/24/5688 04:37 AM     Lab Results   Component Value Date/Time    Sodium 142 10/31/2018 04:37 AM    Potassium 5.0 10/31/2018 04:37 AM    Chloride 108 10/31/2018 04:37 AM    CO2 25 10/31/2018 04:37 AM    Glucose 75 10/31/2018 04:37 AM    BUN 14 10/31/2018 04:37 AM Creatinine 1.02 10/31/2018 04:37 AM     No results found for: CHOL, CHOLX, CHLST, CHOLV, HDL, LDL, LDLC, DLDLP, TGLX, TRIGL, TRIGP  No results found for: GPT  No results found for: HBA1C, HGBE8, SHA8VHIL, BLE4CZGT      Cardiology Procedures:   Results for orders placed or performed during the hospital encounter of 10/28/18   EKG, 12 LEAD, INITIAL   Result Value Ref Range    Ventricular Rate 46 BPM    Atrial Rate 46 BPM    P-R Interval 242 ms    QRS Duration 90 ms    Q-T Interval 476 ms    QTC Calculation (Bezet) 416 ms    Calculated P Axis 53 degrees    Calculated R Axis -18 degrees    Calculated T Axis 14 degrees    Diagnosis       Marked sinus bradycardia with 1st degree AV block  Abnormal ECG               Impression / Plan:    Patient Active Problem List   Diagnosis Code         Parkinson's disease ([facility] [address] G20    GERD (gastroesophageal reflux disease) K21.9    Hypertension I10    BPH (benign prostatic hyperplasia) N40.0    Acute cystitis without hematuria N30.00    Pneumonia J18.9    Compression fracture of body of thoracic vertebra (HCC) S22.000A     Bradycardia    Telemetry reviewed. Heart rate is more than 40 bpm during daytime. Patient has sinus bradycardia but no clear indication for permanent pacemaker at this time.   Avoid AV ricardo blocking agents  Continue management as per hospital medicine      Signed By: Mat Anguiano MD     October 31, 2018

## 2018-11-01 NOTE — PROGRESS NOTES
Hospitalist Progress Note Patient: Deion Cruz MRN: [id number]  CSN: [id number] YOB: 1940  Age: [de-identified] y.o. Sex: male DOA: 10/28/2018 LOS:  LOS: 2 days Assessment/Plan Patient Active Problem List  
Diagnosis Code  Hypoxia R09.02  
 Parkinson's disease ([facility] [address] G20  
 GERD (gastroesophageal reflux disease) K21.9  Hypertension I10  
 BPH (benign prostatic hyperplasia) N40.0  Acute cystitis without hematuria N30.00  Pneumonia J18.9  Compression fracture of body of thoracic vertebra ([facility] [address] S22.000A  
  
 
[de-identified] yo male admitted for fall, compression fracture and hypoxia Hypoxia - CT scan chest showed no evidence of PE, Lower lung field interstitial coarsening may be chronic versus bronchitis/pneumonitis. Encourage incentive spirometry, oxygen by NC, wean as tolerated.  
  
Pneumonia - on ceftriaxone and azithromycin.  
  
UTI - antibiotics as above, no growth on urine cultures.  
  
HTN - controlled on home medications. 
  
Parkinson's disease - continue levadopa carbidopa 
  
BPH - on flomax 
  
GERD - continue with PPI 
  
Compression fracture - thoracic spine, no back pain. PT/OT Ortho consulted, no indication for surgery. 
  
DVT prophylaxis. Disposition : 1-2 days Review of systems General: No fevers or chills. Cardiovascular: No chest pain or pressure. No palpitations. Pulmonary: No shortness of breath. Gastrointestinal: No nausea, vomiting. Physical Exam: 
General: Awake, cooperative, no acute distress   
HEENT: NC, Atraumatic. PERRLA, anicteric sclerae. Lungs: CTA Bilaterally. No Wheezing/Rhonchi/Rales. Heart:  Regular  rhythm,  No murmur, No Rubs, No Gallops Abdomen: Soft, Non distended, Non tender.  +Bowel sounds, Extremities: No c/c/e Psych:   Not anxious or agitated. Neurologic:  No acute neurological deficit. Resting tremor Vital signs/Intake and Output: 
Visit Vitals /45 (BP 1 Location: Left arm, BP Patient Position: At rest;Supine) Pulse (!) 59 Temp 97.3 °F (36.3 °C) Resp 16 Ht 5' 11\" (1.803 m) Wt 78.5 kg (173 lb) SpO2 97% BMI 24.13 kg/m² Current Shift:  10/31 1901 - 11/01 0700 In: 240 [P.O.:240] Out: - Last three shifts:  10/30 0701 - 10/31 1900 In: 840 [P.O.:840] Out: 8366 [WKXCI:9938] Labs: Results:  
   
Chemistry Recent Labs 10/31/18 
4575 10/30/18 
0574 GLU 75 84  142  
K 5.0 4.7  108 CO2 25 25 BUN 14 19* CREA 1.02 1.13  
CA 9.1 8.9 AGAP 9 9 BUCR 14 17 AP 72  --   
TP 6.5  --   
ALB 2.9*  --   
GLOB 3.6  --   
AGRAT 0.8  --   
  
CBC w/Diff Recent Labs 10/31/18 
8733 10/30/18 
0325 10/28/18 
2127 WBC 7.5 8.1 9.8  
RBC 3.88* 3.50* 3.73* HGB 13.2 11.9* 12.8* HCT 39.2 35.7* 38.1 * 106* 120* GRANS  --   --  63  
LYMPH  --   --  28  
EOS  --   --  1 Cardiac Enzymes No results for input(s): CPK, CKND1, PARK in the last 72 hours. No lab exists for component: [place] Coagulation No results for input(s): PTP, INR, APTT in the last 72 hours. No lab exists for component: INREXT, INREXT Lipid Panel No results found for: CHOL, CHOLPOCT, CHOLX, CHLST, CHOLV, 037274, HDL, LDL, LDLC, DLDLP, 254868, VLDLC, VLDL, TGLX, TRIGL, TRIGP, TGLPOCT, CHHD, CHHDX  
BNP No results for input(s): BNPP in the last 72 hours. Liver Enzymes Recent Labs 10/31/18 
2815 TP 6.5 ALB 2.9* AP 72 SGOT 19 Thyroid Studies No results found for: T4, T3U, TSH, TSHEXT, TSHEXT Procedures/imaging: see electronic medical records for all procedures/Xrays and details which were not copied into this note but were reviewed prior to creation of Plan

## 2018-11-01 NOTE — PROGRESS NOTES
Problem: Falls - Risk of 
Goal: *Absence of Falls Document Caleb Hollow Fall Risk and appropriate interventions in the flowsheet. Outcome: Progressing Towards Goal 
Fall Risk Interventions: 
Mobility Interventions: PT Consult for mobility concerns, PT Consult for assist device competence Mentation Interventions: Adequate sleep, hydration, pain control, Toileting rounds Medication Interventions: Teach patient to arise slowly, Patient to call before getting OOB Elimination Interventions: Patient to call for help with toileting needs History of Falls Interventions: Investigate reason for fall, Evaluate medications/consider consulting pharmacy Problem: Pressure Injury - Risk of 
Goal: *Prevention of pressure injury Document Kd Scale and appropriate interventions in the flowsheet. Outcome: Progressing Towards Goal 
Pressure Injury Interventions: 
Sensory Interventions: Discuss PT/OT consult with provider Moisture Interventions: Check for incontinence Q2 hours and as needed, Limit adult briefs Activity Interventions: Pressure redistribution bed/mattress(bed type), PT/OT evaluation Mobility Interventions: PT/OT evaluation, Pressure redistribution bed/mattress (bed type) Nutrition Interventions: Offer support with meals,snacks and hydration, Document food/fluid/supplement intake Friction and Shear Interventions: Foam dressings/transparent film/skin sealants, Apply protective barrier, creams and emollients

## 2018-11-01 NOTE — PROGRESS NOTES
Transition of care: TBD Met with patient at bedside. States he has pcp Jeremy Beckman but has not seen since he has been at [facility] for last 2 months. Patient states PT has not worked with him since he has been here yet. States ortho MD does not have any recommendations for surgery. States he fell and that is when injured his back. States he has been living at assisted living since his wife . Patient is willing to go the rehab if need be but does want to return to assisted living at [facility]. Patient uses a cane usually to stabilize himself when he walks. Patient denies pain at this time. Cm has sent referral to [facility] but waiting on recommendations. 1500 telephone call with  Zuleima Plummer she is able to accommodate patient to rehab tomorrow. Dr. Rosi Potter informed will plan for d/c tomorrow. Patient made aware and agrees to d/c tomorrow. Plan for d/c to rehab tomorrow to [facility],[address] [phone] for report.

## 2018-11-01 NOTE — PROGRESS NOTES
1910 Verbal bedside received from Marjorie Saunders off going nurse. Assumed patient care, bed in low position, call light within reach, white board updated. Assessment completed and bed alarm on.  
 
2200 Meds administered, no complains of pain. 0000 Patient found standing by the bathroom door, gown and electrodes off. Patient stated that he had walked to the bathroom to pee. Assisted back to bed, Changed beds as the alarm of his bed wasn't functioning. Patient reminded to call the nurse for help. Bed alarm placed on, external catheter placed on patient. 0400 Patient still more confused, disoriented to place, time and situation. Stated that he was waiting on his son while in the restaurant. Reoriented and stated that he felt more confused. Changed to a different room closer to nursing station due to increased confusion and problem with heating system. 0600 Patient resting quietly. Bedside and Verbal shift change report given to Deysi Vital RN (oncoming nurse) by Luis Valdez (offgoing nurse). Report included the following information SBAR, Kardex and MAR.

## 2018-11-01 NOTE — PROGRESS NOTES
Problem: Self Care Deficits Care Plan (Adult) Goal: *Acute Goals and Plan of Care (Insert Text) Occupational Therapy Goals Initiated 11/1/2018 within 7 day(s). 1.  Patient will perform upper body dressing with supervision/set-up 2. Patient will perform lower body dressing with supervision/set-up. 3.  Patient will perform toilet transfers with supervision/set-up. 4.  Patient will perform all aspects of toileting with supervision/set-up. 5.  Patient will participate in upper extremity therapeutic exercise/activities with supervision/set-up for 5 minutes. 6.  Patient will complete standing with supervision for 5 minutes during ADL to increase activity tolerance for functional activity. Occupational Therapy EVALUATION Patient: Emilio Keyes (71 y.o. male) Date: 11/1/2018 Primary Diagnosis: Hypoxia Precautions:   Fall ASSESSMENT : 
Based on the objective data described below, the patient presents with hypoxia and T12 compression fracture. Pt also has T5, 8, 10 and 12 compression fractures. No cord compression per Dr. Shawn Sanford. Pt has history of Parkinson's and normally lives at [place] [address] at [facility]. Pt mod assist for bed mobility and had difficulty following direction for log rolling this session. Pt completed sit to stand and sit step along bedside about 5 using RW with mod assist and verbal cues. Mod assist for LB dressing using cross leg technique and assist in standing. Pt could benefit from OT to increase I with ADLs, transfers, mobility, activity tolerance and strength for functional activity. Patient will benefit from skilled intervention to address the above impairments. Patients rehabilitation potential is considered to be Good Factors which may influence rehabilitation potential include:  
[]             None noted []             Mental ability/status []             Medical condition []             Home/family situation and support systems []             Safety awareness []             Pain tolerance/management 
[]             Other: PLAN : 
Recommendations and Planned Interventions: 
[x]               Self Care Training                  [x]        Therapeutic Activities [x]               Functional Mobility Training    []        Cognitive Retraining 
[x]               Therapeutic Exercises           [x]        Endurance Activities [x]               Balance Training                   []        Neuromuscular Re-Education []               Visual/Perceptual Training     [x]   Home Safety Training 
[x]               Patient Education                 [x]        Family Training/Education []               Other (comment): Frequency/Duration: Patient will be followed by occupational therapy 1-2 times per day/4-7 days per week to address goals. Discharge Recommendations: Rehab Further Equipment Recommendations for Discharge: N/A  
 
SUBJECTIVE:  
Patient stated I don't want to fall.  OBJECTIVE DATA SUMMARY:  
 
Past Medical History:  
Diagnosis Date  BPH (benign prostatic hyperplasia)  GERD (gastroesophageal reflux disease)  Hypertension  Osteopenia  Parkinson's disease ([facility] [address] History reviewed. No pertinent surgical history. Barriers to Learning/Limitations: yes;  physical 
Compensate with: visual, verbal, tactile, kinesthetic cues/model Prior Level of Function/Home Situation: I with ADLs prior to admission and used SPC or RW for mobility with falls prior Home Situation Home Environment: Assisted living Care Facility Name: ([address] [place] [address]) One/Two Story Residence: One story Living Alone: No 
Support Systems: Assisted living Patient Expects to be Discharged to[de-identified] Rehabilitation facility Current DME Used/Available at Home: Cane, straight, Walker, rolling Tub or Shower Type: Shower 
[]  Right hand dominant   []  Left hand dominantCognitive/Behavioral Status: 
Neurologic State: Alert Orientation Level: Oriented X4 
 Cognition: Follows commands Safety/Judgement: Decreased awareness of need for assistance;Decreased insight into deficits Skin: intact Edema: none noted Vision/Perceptual:  N/A Coordination: 
Coordination: Generally decreased, functional 
Gross Motor Skills-Upper: Left Intact; Right Intact Balance: 
Sitting: Intact; With support Standing: Impaired; With support Standing - Static: Fair Standing - Dynamic : FairStrength: 
Strength: Generally decreased, functional 
Range of Motion: 
AROM: Generally decreased, functional 
Functional Mobility and Transfers for ADLs: 
Bed Mobility: 
Supine to Sit: Moderate assistance Sit to Supine: Moderate assistance Scooting: Stand-by assistance Transfers: 
Sit to Stand: Moderate assistance ADL Assessment: 
Upper Body Dressing: Minimum assistance Lower Body Dressing: Moderate assistance ADL Intervention: 
Cognitive Retraining Safety/Judgement: Decreased awareness of need for assistance;Decreased insight into deficits Pain: 
Pain Scale 1: Numeric (0 - 10) Pain Intensity 1: 0 Activity Tolerance:  
fair Please refer to the flowsheet for vital signs taken during this treatment. After treatment:  
[] Patient left in no apparent distress sitting up in chair 
[x] Patient left in no apparent distress in bed 
[x] Call bell left within reach 
[] Nursing notified 
[] Caregiver present 
[] Bed alarm activated COMMUNICATION/EDUCATION:  
[x] Home safety education was provided and the patient/caregiver indicated understanding. [x] Patient/family have participated as able in goal setting and plan of care. [x] Patient/family agree to work toward stated goals and plan of care. [] Patient understands intent and goals of therapy, but is neutral about his/her participation. [] Patient is unable to participate in goal setting and plan of care. Thank you for this referral. 
Ervin Burns, OTR/L Time Calculation: 14 mins Carry  Current  CL= 60-79%  Goal  CI= 1-19%. The severity rating is based on the Level of Assistance required for Functional Mobility and ADLs.

## 2018-11-02 VITALS
WEIGHT: 170.8 LBS | SYSTOLIC BLOOD PRESSURE: 99 MMHG | TEMPERATURE: 97.3 F | BODY MASS INDEX: 23.91 KG/M2 | RESPIRATION RATE: 18 BRPM | HEART RATE: 55 BPM | HEIGHT: 71 IN | OXYGEN SATURATION: 100 % | DIASTOLIC BLOOD PRESSURE: 46 MMHG

## 2018-11-02 PROCEDURE — 90686 IIV4 VACC NO PRSV 0.5 ML IM: CPT | Performed by: HOSPITALIST

## 2018-11-02 PROCEDURE — 74011250636 HC RX REV CODE- 250/636: Performed by: HOSPITALIST

## 2018-11-02 PROCEDURE — 74011250637 HC RX REV CODE- 250/637: Performed by: HOSPITALIST

## 2018-11-02 PROCEDURE — 90471 IMMUNIZATION ADMIN: CPT

## 2018-11-02 RX ADMIN — SPIRONOLACTONE 25 MG: 25 TABLET ORAL at 09:59

## 2018-11-02 RX ADMIN — TAMSULOSIN HYDROCHLORIDE 0.4 MG: 0.4 CAPSULE ORAL at 09:59

## 2018-11-02 RX ADMIN — CARBIDOPA AND LEVODOPA 1 TABLET: 25; 100 TABLET ORAL at 09:59

## 2018-11-02 RX ADMIN — ENOXAPARIN SODIUM 40 MG: 40 INJECTION SUBCUTANEOUS at 04:50

## 2018-11-02 RX ADMIN — Medication 10 ML: at 06:08

## 2018-11-02 RX ADMIN — SODIUM CHLORIDE 500 MG: 900 INJECTION, SOLUTION INTRAVENOUS at 04:53

## 2018-11-02 RX ADMIN — INFLUENZA VIRUS VACCINE 0.5 ML: 15; 15; 15; 15 SUSPENSION INTRAMUSCULAR at 13:06

## 2018-11-02 RX ADMIN — Medication 81 MG: at 09:59

## 2018-11-02 RX ADMIN — PANTOPRAZOLE SODIUM 40 MG: 40 TABLET, DELAYED RELEASE ORAL at 09:59

## 2018-11-02 NOTE — ROUTINE PROCESS
Bedside and Verbal shift change report given to Marbella Hunter (oncoming nurse) by Max Ocampo RN 
 (offgoing nurse). Report included the following information SBAR, Kardex, Intake/Output, MAR, Recent Results and Med Rec Status.

## 2018-11-02 NOTE — PROGRESS NOTES
Hospitalist Progress Note Patient: Velasquez Lieberman MRN: [id number]  CSN: [id number] YOB: 1940  Age: [de-identified] y.o. Sex: male DOA: 10/28/2018 LOS:  LOS: 4 days Assessment/Plan Patient Active Problem List  
Diagnosis Code  Hypoxia R09.02  
 Parkinson's disease ([facility] [address] G20  
 GERD (gastroesophageal reflux disease) K21.9  Hypertension I10  
 BPH (benign prostatic hyperplasia) N40.0  Acute cystitis without hematuria N30.00  Pneumonia J18.9  Compression fracture of body of thoracic vertebra ([facility] [address] S22.000A  
  
 
[de-identified] yo male admitted for fall, compression fracture and hypoxia. Patient seen and examined, no change from discharge summary done yesterday. Hypoxia - CT scan chest showed no evidence of PE, Lower lung field interstitial coarsening may be chronic versus bronchitis/pneumonitis. Encourage incentive spirometry, no saturating well on RA.  
  
Pneumonia - on ceftriaxone and azithromycin.  
  
UTI - antibiotics as above, no growth on urine cultures.  
  
HTN - controlled on home medications. 
  
Parkinson's disease - continue levadopa carbidopa 
  
BPH - on flomax 
  
GERD - continue with PPI 
  
Compression fracture - thoracic spine, no back pain. PT/OT Ortho consulted, no indication for surgery. 
  
DVT prophylaxis. Disposition : 1-2 days Review of systems General: No fevers or chills. Cardiovascular: No chest pain or pressure. No palpitations. Pulmonary: No shortness of breath. Gastrointestinal: No nausea, vomiting. Physical Exam: 
General: Awake, cooperative, no acute distress   
HEENT: NC, Atraumatic. PERRLA, anicteric sclerae. Lungs: CTA Bilaterally. No Wheezing/Rhonchi/Rales. Heart:  Regular  rhythm,  No murmur, No Rubs, No Gallops Abdomen: Soft, Non distended, Non tender.  +Bowel sounds, Extremities: No c/c/e Psych:   Not anxious or agitated. Neurologic:  No acute neurological deficit. Resting tremor Vital signs/Intake and Output: 
Visit Vitals /55 (BP 1 Location: Right arm, BP Patient Position: At rest) Pulse (!) 50 Temp 98.2 °F (36.8 °C) Resp 18 Ht 5' 11\" (1.803 m) Wt 77.5 kg (170 lb 12.8 oz) SpO2 100% BMI 23.82 kg/m² Current Shift:  No intake/output data recorded. Last three shifts:  10/31 1901 - 11/02 0700 In: 360 [P.O.:360] Out: 400 [Urine:400] Labs: Results:  
   
Chemistry Recent Labs 11/01/18 
0220 10/31/18 
6878 GLU 79 75  142  
K 4.3 5.0  
 108 CO2 25 25 BUN 14 14 CREA 0.91 1.02  
CA 9.0 9.1 AGAP 9 9 BUCR 15 14 AP 74 72 TP 6.0* 6.5 ALB 2.6* 2.9*  
GLOB 3.4 3.6 AGRAT 0.8 0.8 CBC w/Diff Recent Labs 11/01/18 
0220 10/31/18 
5592 WBC 7.3 7.5  
RBC 3.65* 3.88* HGB 12.4* 13.2 HCT 36.6 39.2 * 112* GRANS 44  --   
LYMPH 37  --   
EOS 6*  --   
  
Cardiac Enzymes No results for input(s): CPK, CKND1, PARK in the last 72 hours. No lab exists for component: [place]zzie [place] Coagulation No results for input(s): PTP, INR, APTT in the last 72 hours. No lab exists for component: INREXT, INREXT Lipid Panel No results found for: CHOL, CHOLPOCT, CHOLX, CHLST, CHOLV, 577341, HDL, LDL, LDLC, DLDLP, 202412, VLDLC, VLDL, TGLX, TRIGL, TRIGP, TGLPOCT, CHHD, CHHDX  
BNP No results for input(s): BNPP in the last 72 hours. Liver Enzymes Recent Labs 11/01/18 0220 TP 6.0* ALB 2.6* AP 74 SGOT 18 Thyroid Studies No results found for: T4, T3U, TSH, TSHEXT, TSHEXT Procedures/imaging: see electronic medical records for all procedures/Xrays and details which were not copied into this note but were reviewed prior to creation of Plan

## 2018-11-02 NOTE — PROGRESS NOTES
Transition of care: d/c to rehab today Patient has agreed to go to rehab and he was made aware that the plan would be to rehab today RN patient will need transportation to Rehab 
RN please call report to [facility],[address] [phone] for report. Prior to patient leaving Please include all hard scripts for controlled substances, med rec and dc summary in packet. Please medicate for pain prior to dc if possible and needed to help offset delay when patient first arrives to facility. Met with patient at bedside along with Dr. Hamlet Yoo he agrees to Plan of care. States he will call his son and let him know he is going back to [facility]. There is no number for son on chart. States he will call him. Patient needs transportation. Drashana Otero  has set up transportation for 1330 RN please call report prior to patient leaving [facility] to [facility],[address], [address] [place] [address] Ave for report. Please include all hard scripts for controlled substances, med rec and dc summary in packet. Please medicate for pain prior to dc if possible and needed to help offset delay when patient first arrives to facility. Care Management Interventions PCP Verified by CM: Yes Mode of Transport at Discharge: BLS Transition of Care Consult (CM Consult): SNF Partner SNF: Yes Physical Therapy Consult: Yes Occupational Therapy Consult: Yes Current Support Network: Assisted Living Plan discussed with Pt/Family/Caregiver: Yes Freedom of Choice Offered: Yes Discharge Location Discharge Placement: Skilled nursing facility

## 2018-11-02 NOTE — PROGRESS NOTES
Problem: Falls - Risk of 
Goal: *Absence of Falls Document Issac Harrison Fall Risk and appropriate interventions in the flowsheet. Outcome: Progressing Towards Goal 
Fall Risk Interventions: 
Mobility Interventions: Bed/chair exit alarm, Patient to call before getting OOB, PT Consult for mobility concerns Mentation Interventions: Adequate sleep, hydration, pain control, Bed/chair exit alarm, Door open when patient unattended, Evaluate medications/consider consulting pharmacy, Room close to nurse's station, Update white board, Toileting rounds Medication Interventions: Bed/chair exit alarm, Evaluate medications/consider consulting pharmacy, Patient to call before getting OOB Elimination Interventions: Bed/chair exit alarm, Call light in reach, Urinal in reach History of Falls Interventions: Bed/chair exit alarm, Door open when patient unattended, Room close to nurse's station, Evaluate medications/consider consulting pharmacy

## 2018-11-02 NOTE — PROGRESS NOTES
[id number] Assumed responsibility for patient from Eloisa Zamora RN 
 
[phone] Attempted to call report to [place]. No answer [phone] [facility] report to Kendra Linn RN at [email or web address], [employer] and [employer].

## 2018-11-07 ENCOUNTER — PATIENT OUTREACH (OUTPATIENT)
Dept: CASE MANAGEMENT | Age: 78
End: 2018-11-07

## 2018-11-14 ENCOUNTER — PATIENT OUTREACH (OUTPATIENT)
Dept: CASE MANAGEMENT | Age: 78
End: 2018-11-14

## 2018-11-14 NOTE — PROGRESS NOTES
Community Care Team Documentation for Patient in [facility]     Patient discharged from THE [facility] 10/28/2018 - 11/2/2018 to [address] [facility] [place], on 11/2/2018. Hospital Discharge diagnosis:  Hypoxia. SNF Attending Provider:      Anticipated discharge date from SNF:        PCP : Belen, MD Marlene    Nurse Navigator:     [facility] Team rounds completed, updates provided by facility. low activity natalia. Mod assist FWW 20ft. DNR. From [facility]. Low Risk            5       Total Score        3 Has Seen PCP in Last 6 Months (Yes=3, No=0)    2 . Living with Significant Other. Assisted Living. LTAC. SNF. or   Rehab        Criteria that do not apply:    Patient Length of Stay (>5 days = 3)    IP Visits Last 12 Months (1-3=4, 4=9, >4=11)    Pt.  Coverage (Medicare=5 , Medicaid, or Self-Pay=4)    Charlson Comorbidity Score (Age + Comorbid Conditions)      Active Ambulatory Problems     Diagnosis Date Noted    Hypoxia 10/29/2018    Parkinson's disease ([address]     GERD (gastroesophageal reflux disease)     Hypertension     BPH (benign prostatic hyperplasia)     Acute cystitis without hematuria 10/29/2018    Pneumonia 10/29/2018    Compression fracture of body of thoracic vertebra ([address] 10/29/2018     Resolved Ambulatory Problems     Diagnosis Date Noted    No Resolved Ambulatory Problems     Past Medical History:   Diagnosis Date    BPH (benign prostatic hyperplasia)     GERD (gastroesophageal reflux disease)     Hypertension     Osteopenia     Parkinson's disease ([address]

## 2018-11-14 NOTE — PROGRESS NOTES
Community Care Team Documentation for Patient in [facility]     Patient discharged from THE [facility] 10/28/2018 - 11/2/2018 to [address] [place], on 11/2/2018. Hospital Discharge diagnosis:  Hypoxia. SNF Attending Provider:      Anticipated discharge date from SNF:        PCP : Belen, MD Marlene    Nurse Navigator:     [facility] Team rounds completed, updates provided by facility. DNR,   Dispo:dc to [facility] with increased level of care. Dc 11/26. Progressing with therapy. Low Risk            5       Total Score        3 Has Seen PCP in Last 6 Months (Yes=3, No=0)    2 . Living with Significant Other. Assisted Living. LTAC. SNF. or   Rehab        Criteria that do not apply:    Patient Length of Stay (>5 days = 3)    IP Visits Last 12 Months (1-3=4, 4=9, >4=11)    Pt.  Coverage (Medicare=5 , Medicaid, or Self-Pay=4)    Charlson Comorbidity Score (Age + Comorbid Conditions)      Active Ambulatory Problems     Diagnosis Date Noted    Hypoxia 10/29/2018    Parkinson's disease ([address]     GERD (gastroesophageal reflux disease)     Hypertension     BPH (benign prostatic hyperplasia)     Acute cystitis without hematuria 10/29/2018    Pneumonia 10/29/2018    Compression fracture of body of thoracic vertebra ([address] 10/29/2018     Resolved Ambulatory Problems     Diagnosis Date Noted    No Resolved Ambulatory Problems     Past Medical History:   Diagnosis Date    BPH (benign prostatic hyperplasia)     GERD (gastroesophageal reflux disease)     Hypertension     Osteopenia     Parkinson's disease ([address]

## 2018-11-21 ENCOUNTER — PATIENT OUTREACH (OUTPATIENT)
Dept: CASE MANAGEMENT | Age: 78
End: 2018-11-21

## 2018-11-28 ENCOUNTER — PATIENT OUTREACH (OUTPATIENT)
Dept: CASE MANAGEMENT | Age: 78
End: 2018-11-28

## 2018-11-28 NOTE — PROGRESS NOTES
Community Care Team Documentation for Patient in [facility]     Patient discharged from THE [facility] 10/28/2018 - 11/2/2018 to [address] [place], on 11/2/2018. Hospital Discharge diagnosis:  Hypoxia. SNF Attending Provider:      Anticipated discharge date from SNF: 11/26/2018       PCP : Belen, MD Marlene    Nurse Navigator:     [facility] Team rounds completed, updates provided by facility. DNR,   Dispo:dc to [facility] with increased level of care. Dc 11/26. Progressing with therapy. On target, no changes. Low Risk            5       Total Score        3 Has Seen PCP in Last 6 Months (Yes=3, No=0)    2 . Living with Significant Other. Assisted Living. LTAC. SNF. or   Rehab        Criteria that do not apply:    Patient Length of Stay (>5 days = 3)    IP Visits Last 12 Months (1-3=4, 4=9, >4=11)    Pt.  Coverage (Medicare=5 , Medicaid, or Self-Pay=4)    Charlson Comorbidity Score (Age + Comorbid Conditions)      Active Ambulatory Problems     Diagnosis Date Noted    Hypoxia 10/29/2018    Parkinson's disease ([address]     GERD (gastroesophageal reflux disease)     Hypertension     BPH (benign prostatic hyperplasia)     Acute cystitis without hematuria 10/29/2018    Pneumonia 10/29/2018    Compression fracture of body of thoracic vertebra ([address] 10/29/2018     Resolved Ambulatory Problems     Diagnosis Date Noted    No Resolved Ambulatory Problems     Past Medical History:   Diagnosis Date    BPH (benign prostatic hyperplasia)     GERD (gastroesophageal reflux disease)     Hypertension     Osteopenia     Parkinson's disease ([address]

## 2018-11-28 NOTE — PROGRESS NOTES
Community Care Team Documentation for Patient in [facility]     Patient discharged from THE [facility] 10/28/2018 - 11/2/2018 to [address] [place] [address] HOSPITAL, on 11/2/2018. Hospital Discharge diagnosis:  Hypoxia. SNF Attending Provider:      Anticipated discharge date from SNF: 11/26/2018       PCP : Belen, MD Marlene    Nurse Navigator:     [facility] Team rounds completed, updates provided by facility. DNR,   Discharged to [facility] 11/26. Low Risk            5       Total Score        3 Has Seen PCP in Last 6 Months (Yes=3, No=0)    2 . Living with Significant Other. Assisted Living. LTAC. SNF. or   Rehab        Criteria that do not apply:    Patient Length of Stay (>5 days = 3)    IP Visits Last 12 Months (1-3=4, 4=9, >4=11)    Pt.  Coverage (Medicare=5 , Medicaid, or Self-Pay=4)    Charlson Comorbidity Score (Age + Comorbid Conditions)      Active Ambulatory Problems     Diagnosis Date Noted    Hypoxia 10/29/2018    Parkinson's disease ([address]     GERD (gastroesophageal reflux disease)     Hypertension     BPH (benign prostatic hyperplasia)     Acute cystitis without hematuria 10/29/2018    Pneumonia 10/29/2018    Compression fracture of body of thoracic vertebra ([address] 10/29/2018     Resolved Ambulatory Problems     Diagnosis Date Noted    No Resolved Ambulatory Problems     Past Medical History:   Diagnosis Date    BPH (benign prostatic hyperplasia)     GERD (gastroesophageal reflux disease)     Hypertension     Osteopenia     Parkinson's disease ([address]

## 2018-12-25 LAB
ATRIAL RATE: 45 BPM
CALCULATED P AXIS, ECG09: 86 DEGREES
CALCULATED R AXIS, ECG10: -29 DEGREES
CALCULATED T AXIS, ECG11: 10 DEGREES
DIAGNOSIS, 93000: NORMAL
P-R INTERVAL, ECG05: 226 MS
Q-T INTERVAL, ECG07: 506 MS
QRS DURATION, ECG06: 98 MS
QTC CALCULATION (BEZET), ECG08: 437 MS
VENTRICULAR RATE, ECG03: 45 BPM

## 2019-01-24 LAB
ATRIAL RATE: 46 BPM
CALCULATED P AXIS, ECG09: 53 DEGREES
CALCULATED R AXIS, ECG10: -18 DEGREES
CALCULATED T AXIS, ECG11: 14 DEGREES
DIAGNOSIS, 93000: NORMAL
P-R INTERVAL, ECG05: 242 MS
Q-T INTERVAL, ECG07: 476 MS
QRS DURATION, ECG06: 90 MS
QTC CALCULATION (BEZET), ECG08: 416 MS
VENTRICULAR RATE, ECG03: 46 BPM

## 2019-10-22 ENCOUNTER — HOSPITAL ENCOUNTER (EMERGENCY)
Age: 79
Discharge: HOME | End: 2019-10-22
Attending: EMERGENCY MEDICINE
Payer: MEDICARE

## 2019-10-22 ENCOUNTER — APPOINTMENT (OUTPATIENT)
Dept: GENERAL RADIOLOGY | Age: 79
End: 2019-10-22
Attending: EMERGENCY MEDICINE
Payer: MEDICARE

## 2019-10-22 VITALS
TEMPERATURE: 97.8 F | HEART RATE: 65 BPM | OXYGEN SATURATION: 96 % | DIASTOLIC BLOOD PRESSURE: 57 MMHG | HEIGHT: 70 IN | WEIGHT: 170 LBS | BODY MASS INDEX: 24.34 KG/M2 | SYSTOLIC BLOOD PRESSURE: 137 MMHG | RESPIRATION RATE: 18 BRPM

## 2019-10-22 DIAGNOSIS — S61.216A LACERATION OF RIGHT LITTLE FINGER WITHOUT FOREIGN BODY WITHOUT DAMAGE TO NAIL, INITIAL ENCOUNTER: ICD-10-CM

## 2019-10-22 DIAGNOSIS — S63.259A OPEN FINGER DISLOCATION, INITIAL ENCOUNTER: Primary | ICD-10-CM

## 2019-10-22 DIAGNOSIS — S61.209A OPEN FINGER DISLOCATION, INITIAL ENCOUNTER: Primary | ICD-10-CM

## 2019-10-22 PROCEDURE — 75810000303 HC CLSD TRMT  FRACTURE/DISLOCATION W/  ANES

## 2019-10-22 PROCEDURE — 73140 X-RAY EXAM OF FINGER(S): CPT

## 2019-10-22 PROCEDURE — 99284 EMERGENCY DEPT VISIT MOD MDM: CPT

## 2019-10-22 PROCEDURE — 75810000293 HC SIMP/SUPERF WND  RPR

## 2019-10-22 PROCEDURE — 90471 IMMUNIZATION ADMIN: CPT

## 2019-10-22 PROCEDURE — 96375 TX/PRO/DX INJ NEW DRUG ADDON: CPT

## 2019-10-22 PROCEDURE — 96374 THER/PROPH/DIAG INJ IV PUSH: CPT

## 2019-10-22 PROCEDURE — 74011250636 HC RX REV CODE- 250/636: Performed by: EMERGENCY MEDICINE

## 2019-10-22 PROCEDURE — 90715 TDAP VACCINE 7 YRS/> IM: CPT | Performed by: EMERGENCY MEDICINE

## 2019-10-22 RX ORDER — CEPHALEXIN 500 MG/1
500 CAPSULE ORAL 3 TIMES DAILY
Qty: 9 CAP | Refills: 0 | Status: SHIPPED | OUTPATIENT
Start: 2019-10-22 | End: 2019-10-22 | Stop reason: SDUPTHER

## 2019-10-22 RX ORDER — CEPHALEXIN 500 MG/1
500 CAPSULE ORAL 3 TIMES DAILY
Qty: 9 CAP | Refills: 0 | Status: SHIPPED | OUTPATIENT
Start: 2019-10-22 | End: 2019-10-25

## 2019-10-22 RX ORDER — MORPHINE SULFATE 2 MG/ML
2 INJECTION, SOLUTION INTRAMUSCULAR; INTRAVENOUS ONCE
Status: COMPLETED | OUTPATIENT
Start: 2019-10-22 | End: 2019-10-22

## 2019-10-22 RX ORDER — IBUPROFEN 600 MG/1
600 TABLET ORAL
Qty: 20 TAB | Refills: 0 | Status: SHIPPED | OUTPATIENT
Start: 2019-10-22

## 2019-10-22 RX ORDER — CEFAZOLIN SODIUM/WATER 2 G/20 ML
2 SYRINGE (ML) INTRAVENOUS
Status: COMPLETED | OUTPATIENT
Start: 2019-10-22 | End: 2019-10-22

## 2019-10-22 RX ORDER — LIDOCAINE HYDROCHLORIDE 10 MG/ML
5 INJECTION INFILTRATION; PERINEURAL
Status: DISCONTINUED | OUTPATIENT
Start: 2019-10-22 | End: 2019-10-22 | Stop reason: HOSPADM

## 2019-10-22 RX ADMIN — MORPHINE SULFATE 2 MG: 2 INJECTION, SOLUTION INTRAMUSCULAR; INTRAVENOUS at 14:43

## 2019-10-22 RX ADMIN — Medication 2 G: at 15:13

## 2019-10-22 RX ADMIN — TETANUS TOXOID, REDUCED DIPHTHERIA TOXOID AND ACELLULAR PERTUSSIS VACCINE, ADSORBED 0.5 ML: 5; 2.5; 8; 8; 2.5 SUSPENSION INTRAMUSCULAR at 14:46

## 2019-10-22 NOTE — ED NOTES

## 2019-10-22 NOTE — ED PROVIDER NOTES
EMERGENCY DEPARTMENT HISTORY AND PHYSICAL EXAM    Date: 10/22/2019  Patient Name: Wallace Jaimes    History of Presenting Illness     Chief Complaint   Patient presents with    Finger Pain         History Provided By: Patient and EMS      Wallace Jaimes is a 78 y.o. male with PMHX of Parkinson's disease who presents to the emergency department C/O right pinky finger fracture and open wound. Patient states he accidentally lost his balance, which is not unusual for him, and fell backwards. States he broke his fall with his right hand causing the injury to his right little finger. EMS states that the finger appears obviously deformed with a small open wound. Patient denies any numbness or tingling. Denies hitting his head or loss of consciousness. States he cannot recall the last time he had a tetanus shot. Celeste Bullock PCP: Jorge Pulido MD    Current Facility-Administered Medications   Medication Dose Route Frequency Provider Last Rate Last Dose    lidocaine (XYLOCAINE) 10 mg/mL (1 %) injection 5 mL  5 mL IntraDERMal NOW Belvia Mohs A, DO         Current Outpatient Medications   Medication Sig Dispense Refill    cephALEXin (KEFLEX) 500 mg capsule Take 1 Cap by mouth three (3) times daily for 3 days. 9 Cap 0    alendronate (FOSAMAX) 70 mg tablet Take  by mouth.  aspirin delayed-release 81 mg tablet Take  by mouth daily.  carbidopa-levodopa (SINEMET)  mg per tablet Take 1 Tab by mouth three (3) times daily.  pantoprazole (PROTONIX) 40 mg tablet Take 40 mg by mouth daily.  Vit A,C,E-Zinc-Copper (PRESERVISION AREDS) cap capsule Take 1 Cap by mouth.  spironolactone (ALDACTONE) 25 mg tablet Take 25 mg by mouth daily.  tamsulosin (FLOMAX) 0.4 mg capsule Take 0.4 mg by mouth daily.  cyanocobalamin (VITAMIN B-12) 100 mcg tablet Take 2,500 mcg by mouth daily.  ergocalciferol (VITAMIN D2) 50,000 unit capsule Take 50,000 Units by mouth.       cholecalciferol (VITAMIN D3) 1,000 unit cap Take 2,000 Units by mouth daily. Past History     Past Medical History:  Past Medical History:   Diagnosis Date    BPH (benign prostatic hyperplasia)     GERD (gastroesophageal reflux disease)     Hypertension     Osteopenia     Parkinson's disease [address]        Past Surgical History:  History reviewed. No pertinent surgical history. Family History:  History reviewed. No pertinent family history. Social History:  Social History     Tobacco Use    Smoking status: Former Smoker    Smokeless tobacco: Never Used   Substance Use Topics    Alcohol use: No     Frequency: Never    Drug use: No       Allergies:  No Known Allergies      Review of Systems   Review of Systems   Musculoskeletal: Positive for arthralgias, gait problem and joint swelling. Skin: Positive for wound. Neurological: Negative for dizziness, weakness, numbness and headaches. Physical Exam     Vitals:    10/22/19 1413 10/22/19 1430   BP: 162/56 137/57   Pulse: 65    Resp: 18    Temp: 97.8 °F (36.6 °C)    SpO2: 98% 96%   Weight: 77.1 kg (170 lb)    Height: 5' 10\" (1.778 m)      Physical Exam    Nursing notes and vital signs reviewed    Constitutional: Non toxic appearing, no acute distress, chronically ill-appearing  Head: Normocephalic, Atraumatic  Eyes: Pupils are equal, round, and reactive to light, EOMI  Neck: Supple  Cardiovascular: Regular rate and rhythm, no murmurs, rubs, or gallops  Chest: Normal work of breathing and chest excursion bilaterally  Lungs: Clear to ausculation bilaterally  Abdomen: Soft, non tender, non distended, normoactive bowel sounds  Back: No evidence of trauma or deformity  Extremities: No evidence of trauma or deformity, no LE edema, there appears to be an obvious deformity to the left little finger at the proximal interphalangeal joint. There is a small punctate open wound to the palmar aspect approximately 1 cm in size.   Capillary refill is normal.  Patient has decreased range of motion to the finger in question although has maintained his MCP motion. Skin: Warm and dry, normal cap refill  Neuro: Alert and appropriate, CN intact, normal speech, strength and sensation full and symmetric bilaterally, normal gait, normal coordination, baseline tremor  Psychiatric: Normal mood and affect      Diagnostic Study Results     Labs -   No results found for this or any previous visit (from the past 48 hour(s)). Radiologic Studies -   XR 5TH FINGER RT MIN 2 V   Final Result   IMPRESSION:      Dislocation of fifth proximal interphalangeal joint as above. CT Results  (Last 48 hours)    None        CXR Results  (Last 48 hours)    None          Medications given in the ED-  Medications   lidocaine (XYLOCAINE) 10 mg/mL (1 %) injection 5 mL (has no administration in time range)   diph,Pertuss(AC),Tet Vac-PF (BOOSTRIX) suspension 0.5 mL (0.5 mL IntraMUSCular Given 10/22/19 1446)   morphine injection 2 mg (2 mg IntraVENous Given 10/22/19 1443)   ceFAZolin (ANCEF) 2 g/20 mL in sterile water IV syringe (2 g IntraVENous Given 10/22/19 1513)         Medical Decision Making   I am the first provider for this patient. I reviewed the vital signs, available nursing notes, past medical history, past surgical history, family history and social history. Vital Signs-Reviewed the patient's vital signs. Records Reviewed: Nursing Notes    Procedures:  Wound Repair  Date/Time: 10/22/2019 4:32 PM  Performed by: attendingPre-procedure re-eval: Immediately prior to the procedure, the patient was reevaluated and found suitable for the planned procedure and any planned medications.   Location details: right small finger  Wound length:2.5 cm or less  Anesthesia: local infiltration and digital block    Anesthesia:  Local Anesthetic: lidocaine 1% without epinephrine  Anesthetic total: 5 mL  Foreign bodies: no foreign bodies  Irrigation solution: saline  Irrigation method: tap  Debridement: none  Skin closure: 5-0 nylon  Number of sutures: 3  Technique: simple  Approximation: close  Dressing: 4x4  Patient tolerance: Patient tolerated the procedure well with no immediate complications  My total time at bedside, performing this procedure was 1-15 minutes. DISLOCATION-UPPER EXT (ASAP ONLY)  Date/Time: 10/22/2019 4:38 PM  Performed by: Winston Richardson DO  Authorized by: Winston Richardson DO     Consent:     Consent obtained:  Verbal    Consent given by:  Patient    Risks discussed:  Fracture, irreducible dislocation, nerve damage, recurrent dislocation, restricted joint movement and stiffness    Alternatives discussed:  No treatment  Location:     Location:  Finger    Finger location:  R little finger    Finger dislocation type: PIP    Pre-procedure assessment:     Pre-procedure imaging:  X-ray    Imaging findings: dislocation present      Imaging findings: no fracture      Distal perfusion: normal    Anesthesia (see MAR for exact dosages): Anesthesia method:  Nerve block    Block location:  Finger    Block needle gauge:  25 G    Block anesthetic:  Lidocaine 1% w/o epi    Block injection procedure:  Anatomic landmarks identified    Block outcome:  Anesthesia achieved  Procedure details:     Manipulation performed: yes      Finger reduction method:  Direct traction    Reduction successful: yes      Reduction confirmed with imaging: no      Immobilization:  Splint    Splint type:  Static finger  Post-procedure assessment:     Neurological function: normal      Distal perfusion: normal      Range of motion: improved      Patient tolerance of procedure: Tolerated well, no immediate complications        ED Course:       X-ray shows dislocation without fracture of the PIP joint of the right little finger. Patient was given 2 g of Ancef due to the open nature of the wound. He was given tetanus prophylaxis. Laceration repair was completed at bedside. He was placed in a finger splint.     Recommended to keep wound clean and dry for first 24 hours and return in 1 week or go to PCP for suture removal.  Recommended to keep the splint present for at least 1 week and return to his primary care physician for reevaluation of the joint. Recommended to only use soap and water and pat dry if they are to wash the area and dress with antibiotic ointment daily with bandages. Recommended to come back to ED if wound reopens, bleeds or shows signs of infection. They understand and agree to plan. Diagnosis and Disposition         DISCHARGE NOTE:    Miguel Chavez's  results have been reviewed with him. He has been counseled regarding his diagnosis, treatment, and plan. He verbally conveys understanding and agreement of the signs, symptoms, diagnosis, treatment and prognosis and additionally agrees to follow up as discussed. He also agrees with the care-plan and conveys that all of his questions have been answered. I have also provided discharge instructions for him that include: educational information regarding their diagnosis and treatment, and list of reasons why they would want to return to the ED prior to their follow-up appointment, should his condition change. He has been provided with education for proper emergency department utilization. CLINICAL IMPRESSION:    1. Open finger dislocation, initial encounter    2. Laceration of right little finger without foreign body without damage to nail, initial encounter        PLAN:  1. D/C Home  2. Current Discharge Medication List      START taking these medications    Details   cephALEXin (KEFLEX) 500 mg capsule Take 1 Cap by mouth three (3) times daily for 3 days. Qty: 9 Cap, Refills: 0           3.    Follow-up Information     Follow up With Specialties Details Why Contact Info    Micki Singh MD Family Practice Go in 1 week For suture removal, For wound re-check [address]      THE [facility] EMERGENCY DEPT Emergency Medicine Go in 1 week For suture removal, For wound re-check if unable to see your PCP [address] [id number] [phone]        _______________________________      Please note that this dictation was completed with [employer], the computer voice recognition software. Quite often unanticipated grammatical, syntax, homophones, and other interpretive errors are inadvertently transcribed by the computer software. Please disregard these errors. Please excuse any errors that have escaped final proofreading.

## 2020-04-14 ENCOUNTER — APPOINTMENT (OUTPATIENT)
Dept: CT IMAGING | Age: 80
End: 2020-04-14
Attending: EMERGENCY MEDICINE
Payer: MEDICARE

## 2020-04-14 ENCOUNTER — HOSPITAL ENCOUNTER (EMERGENCY)
Age: 80
Discharge: HOME | End: 2020-04-14
Attending: EMERGENCY MEDICINE
Payer: MEDICARE

## 2020-04-14 ENCOUNTER — APPOINTMENT (OUTPATIENT)
Dept: GENERAL RADIOLOGY | Age: 80
End: 2020-04-14
Attending: EMERGENCY MEDICINE
Payer: MEDICARE

## 2020-04-14 VITALS
WEIGHT: 155.2 LBS | BODY MASS INDEX: 22.22 KG/M2 | TEMPERATURE: 97.7 F | SYSTOLIC BLOOD PRESSURE: 125 MMHG | RESPIRATION RATE: 18 BRPM | HEART RATE: 60 BPM | DIASTOLIC BLOOD PRESSURE: 66 MMHG | OXYGEN SATURATION: 96 % | HEIGHT: 70 IN

## 2020-04-14 DIAGNOSIS — M25.561 ACUTE PAIN OF RIGHT KNEE: ICD-10-CM

## 2020-04-14 DIAGNOSIS — M25.551 RIGHT HIP PAIN: ICD-10-CM

## 2020-04-14 DIAGNOSIS — N30.00 ACUTE CYSTITIS WITHOUT HEMATURIA: ICD-10-CM
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
## 2020-04-14 DIAGNOSIS — S00.03XA CONTUSION OF SCALP, INITIAL ENCOUNTER: ICD-10-CM

## 2020-04-14 LAB
APPEARANCE UR: CLEAR
BACTERIA URNS QL MICRO: ABNORMAL /HPF
BILIRUB UR QL: NEGATIVE
COLOR UR: YELLOW
EPITH CASTS URNS QL MICRO: ABNORMAL /LPF (ref 0–5)
GLUCOSE UR STRIP.AUTO-MCNC: NEGATIVE MG/DL
HGB UR QL STRIP: NEGATIVE
KETONES UR QL STRIP.AUTO: ABNORMAL MG/DL
LEUKOCYTE ESTERASE UR QL STRIP.AUTO: ABNORMAL
MUCOUS THREADS URNS QL MICRO: POSITIVE /LPF
NITRITE UR QL STRIP.AUTO: NEGATIVE
PH UR STRIP: 7.5 [PH] (ref 5–8)
PROT UR STRIP-MCNC: NEGATIVE MG/DL
RBC #/AREA URNS HPF: ABNORMAL /HPF (ref 0–5)
SP GR UR REFRACTOMETRY: 1.02 (ref 1–1.03)
UROBILINOGEN UR QL STRIP.AUTO: 1 EU/DL (ref 0.2–1)
WBC URNS QL MICRO: ABNORMAL /HPF (ref 0–5)
YEAST BUDDING URNS QL: POSITIVE

## 2020-04-14 PROCEDURE — 73560 X-RAY EXAM OF KNEE 1 OR 2: CPT

## 2020-04-14 PROCEDURE — 99285 EMERGENCY DEPT VISIT HI MDM: CPT

## 2020-04-14 PROCEDURE — 74011250637 HC RX REV CODE- 250/637: Performed by: EMERGENCY MEDICINE

## 2020-04-14 PROCEDURE — 73700 CT LOWER EXTREMITY W/O DYE: CPT

## 2020-04-14 PROCEDURE — 87086 URINE CULTURE/COLONY COUNT: CPT

## 2020-04-14 PROCEDURE — 81001 URINALYSIS AUTO W/SCOPE: CPT

## 2020-04-14 PROCEDURE — 70450 CT HEAD/BRAIN W/O DYE: CPT

## 2020-04-14 PROCEDURE — 73502 X-RAY EXAM HIP UNI 2-3 VIEWS: CPT

## 2020-04-14 RX ORDER — LIDOCAINE HCL 4 G/100G
CREAM TOPICAL
COMMUNITY

## 2020-04-14 RX ORDER — TOLTERODINE 2 MG/1
2 CAPSULE, EXTENDED RELEASE ORAL DAILY
COMMUNITY

## 2020-04-14 RX ORDER — BENZTROPINE MESYLATE 0.5 MG/1
0.5 TABLET ORAL 2 TIMES DAILY
COMMUNITY

## 2020-04-14 RX ORDER — CEPHALEXIN 500 MG/1
500 CAPSULE ORAL 3 TIMES DAILY
Qty: 21 CAP | Refills: 0 | Status: SHIPPED | OUTPATIENT
Start: 2020-04-14 | End: 2020-04-15

## 2020-04-14 RX ORDER — CEPHALEXIN 500 MG/1
500 CAPSULE ORAL 3 TIMES DAILY
Qty: 21 CAP | Refills: 0 | Status: SHIPPED | OUTPATIENT
Start: 2020-04-14 | End: 2020-04-14

## 2020-04-14 RX ORDER — ACETAMINOPHEN 500 MG
1000 TABLET ORAL ONCE
Status: COMPLETED | OUTPATIENT
Start: 2020-04-14 | End: 2020-04-14

## 2020-04-14 RX ORDER — AMLODIPINE BESYLATE 2.5 MG/1
2.5 TABLET ORAL DAILY
COMMUNITY

## 2020-04-14 RX ORDER — MELATONIN 5 MG
5 CAPSULE ORAL
COMMUNITY

## 2020-04-14 RX ORDER — ACETAMINOPHEN 325 MG/1
650 TABLET ORAL
COMMUNITY

## 2020-04-14 RX ADMIN — ACETAMINOPHEN 1000 MG: 500 TABLET ORAL at 11:34

## 2020-04-14 NOTE — ED PROVIDER NOTES
EMERGENCY DEPARTMENT HISTORY AND PHYSICAL EXAM    Date: 4/14/2020  Patient Name: Kobe Gottlieb    History of Presenting Illness     Chief Complaint   Patient presents with    Fall         History Provided By: Patient and EMS    9:51 AM  Kobe Gottlieb is a 78 y.o. male with PMHX of Parkinson's disease, hypertension who presents to the emergency department C/O right hip pain. Per patient he got up in the dark to use the restroom and tripped over something on the ground. He states he hit his right hip and the back of his head. He denies loss of consciousness, chest pain, shortness of breath, neck pain, back pain, cough, fever, other complaints. He states he normally uses a walker. PCP: Dorice Runner, MD    Current Outpatient Medications   Medication Sig Dispense Refill    acetaminophen (TylenoL) 325 mg tablet Take 650 mg by mouth every four (4) hours as needed for Pain.  amLODIPine (NORVASC) 2.5 mg tablet Take 2.5 mg by mouth daily.  lidocaine (Aspercreme, lidocaine,) 4 % topical cream Apply  to affected area three (3) times daily as needed for Pain. For knee pain      benztropine (COGENTIN) 0.5 mg tablet Take 0.5 mg by mouth two (2) times a day.  melatonin 5 mg cap capsule Take 5 mg by mouth nightly.  tolterodine ER (DETROL-LA) 2 mg ER capsule Take 2 mg by mouth daily.  cephALEXin (Keflex) 500 mg capsule Take 1 Cap by mouth three (3) times daily for 7 days. 21 Cap 0    alendronate (FOSAMAX) 70 mg tablet Take 70 mg by mouth every seven (7) days. Every week on Sunday      aspirin delayed-release 81 mg tablet Take  by mouth daily.  carbidopa-levodopa (SINEMET)  mg per tablet Take 1 Tab by mouth three (3) times daily.  pantoprazole (PROTONIX) 40 mg tablet Take 40 mg by mouth daily.  Vit A,C,E-Zinc-Copper (PRESERVISION AREDS) cap capsule Take 1 Cap by mouth.  spironolactone (ALDACTONE) 25 mg tablet Take 25 mg by mouth daily.       tamsulosin (FLOMAX) 0.4 mg capsule Take 0.4 mg by mouth daily.  cyanocobalamin (VITAMIN B-12) 100 mcg tablet Take 2,500 mcg by mouth daily.  ergocalciferol (VITAMIN D2) 50,000 unit capsule Take 50,000 Units by mouth.  cholecalciferol (VITAMIN D3) 1,000 unit cap Take 2,000 Units by mouth daily.  ibuprofen (MOTRIN) 600 mg tablet Take 1 Tab by mouth every six (6) hours as needed for Pain. 20 Tab 0       Past History     Past Medical History:  Past Medical History:   Diagnosis Date    BPH (benign prostatic hyperplasia)     GERD (gastroesophageal reflux disease)     Hypertension     Osteopenia     Parkinson's disease ([facility] [address]        Past Surgical History:  History reviewed. No pertinent surgical history. Family History:  History reviewed. No pertinent family history. Social History:  Social History     Tobacco Use    Smoking status: Former Smoker    Smokeless tobacco: Never Used   Substance Use Topics    Alcohol use: No     Frequency: Never    Drug use: No       Allergies:  No Known Allergies      Review of Systems   Review of Systems   Constitutional: Negative for fever. Respiratory: Negative for shortness of breath. Cardiovascular: Negative for chest pain. Musculoskeletal: Positive for arthralgias. Neurological: Negative for headaches. All other systems reviewed and are negative.         Physical Exam     Vitals:    04/14/20 1100 04/14/20 1115 04/14/20 1130 04/14/20 1145   BP: 154/70 123/59 143/63 134/64   Pulse: 64 63 63 62   Resp: 16 16 19 19   Temp:       SpO2: 100% 97% 96% 96%   Weight:       Height:         Physical Exam    Nursing notes and vital signs reviewed    Constitutional: Non toxic appearing, elderly, mild distress  Head: Normocephalic, tenderness over posterior scalp  Eyes: Pupils are equal, round, and reactive to light, EOMI  Neck: Supple, no spinal tenderness to palpation  Cardiovascular: Regular rate and rhythm, no murmurs, rubs, or gallops  Chest: Normal work of breathing and chest excursion bilaterally  Lungs: Clear to ausculation bilaterally  Abdomen: Soft, non tender, non distended  Back: No evidence of trauma or deformity  Extremities: Tenderness over right lateral hip, distal neurovascular intact, range of motion slightly limited due to pain  Skin: Warm and dry, normal cap refill  Neuro: Alert and appropriate, face symmetric, normal speech, strength and sensation symmetric bilaterally, normal coordination  Psychiatric: Normal mood and affect      Diagnostic Study Results     Labs -     Recent Results (from the past 12 hour(s))   URINALYSIS W/ RFLX MICROSCOPIC    Collection Time: 04/14/20 12:05 PM   Result Value Ref Range    Color YELLOW      Appearance CLEAR      Specific gravity 1.016 1.005 - 1.030      pH (UA) 7.5 5.0 - 8.0      Protein Negative NEG mg/dL    Glucose Negative NEG mg/dL    Ketone TRACE (A) NEG mg/dL    Bilirubin Negative NEG      Blood Negative NEG      Urobilinogen 1.0 0.2 - 1.0 EU/dL    Nitrites Negative NEG      Leukocyte Esterase SMALL (A) NEG     URINE MICROSCOPIC ONLY    Collection Time: 04/14/20 12:05 PM   Result Value Ref Range    WBC 21 to 30 0 - 5 /hpf    RBC 0 to 3 0 - 5 /hpf    Epithelial cells FEW 0 - 5 /lpf    Bacteria 1+ (A) NEG /hpf    Mucus Positive (A) NEG /lpf    Budding yeast Positive (A) NEG         Radiologic Studies -   CT KNEE RT WO CONT   Final Result   IMPRESSION:   1. No CT findings of a medial tibial plateau fracture, queried radiographically. > Diffuse osteopenia without displaced fracture.      > No evidence of lipohemarthrosis. 2. Tricompartmental right knee joint osteoarthritis, greatest across the lateral   tibiofemoral compartment. XR HIP RT W OR WO PELV 2-3 VWS   Final Result   IMPRESSION:      Mild bilateral hip joint osteoarthritis without evidence of fracture. XR KNEE RT MAX 2 VWS   Final Result   IMPRESSION:         1. Potential nondisplaced/nondepressed medial tibial plateau fracture.    2. Tricompartmental right knee joint osteoarthritis, greatest laterally. 3. Small somewhat dense joint effusion without overt lipohemarthrosis. CT HEAD WO CONT   Final Result   IMPRESSION:         1. No acute intracranial abnormality demonstrated. 2. Subcortical and periventricular white matter low-attenuation, favored to   reflect sequela of chronic ischemic microvascular change. CT Results  (Last 48 hours)               04/14/20 1204  CT KNEE RT WO CONT Final result    Impression:  IMPRESSION:   1. No CT findings of a medial tibial plateau fracture, queried radiographically. > Diffuse osteopenia without displaced fracture.      > No evidence of lipohemarthrosis. 2. Tricompartmental right knee joint osteoarthritis, greatest across the lateral   tibiofemoral compartment. Narrative:  Examination: CT right knee without contrast.       HISTORY: Fall, right knee pain. Evaluation for potential fracture. TECHNIQUE: CT imaging of the right knee was performed in the axial plane   utilizing both bone and soft tissue reconstruction algorithms. Additional   coronal and sagittal reformatted images were performed by the technologist and   are included in interpretation. One or more dose reduction techniques were used on this CT: automated exposure   control, adjustment of the mAs and/or kVp according to patient size, and   iterative reconstruction techniques. The specific techniques used on this CT   exam have been documented in the patient's electronic medical record. Digital   Imaging and Communications in Medicine (DICOM) format image data are available   to nonaffiliated external healthcare facilities or entities on a secure, media   free, reciprocally searchable basis with patient authorization for at least a   12-month period after this study. COMPARISON: Right knee radiographs obtained 4/14/2020. FINDINGS:       Osseous alignment is as expected on the provided imaging.  No definite correlate   is identified. The vertically oriented lucency appreciated involving the medial   tibial plateau on preceding radiographs. Diffuse osteopenia is noted without   evidence of displaced fracture. There is tricompartmental right knee joint   osteoarthritis. These findings are of greatest conspicuity across lateral   tibiofemoral compartment with implied meniscal deficiency. Cruciate ligaments appear grossly intact. Bilateral meniscal chondrocalcinosis. Extensor mechanism is maintained. Quadriceps and patellar tendons are uniform   thickness and morphology. Quadriceps insertional enthesopathy is present. Small joint effusion. No lipohemarthrosis. Atherosclerotic vascular calcifications noted throughout the distal thigh and   proximal leg. Muscle bulk appears within normal limits for patient age. No   retained radiopaque foreign object. Included portions of the contralateral left lower extremity demonstrate no acute   abnormality. 04/14/20 1017  CT HEAD WO CONT Final result    Impression:  IMPRESSION:           1. No acute intracranial abnormality demonstrated. 2. Subcortical and periventricular white matter low-attenuation, favored to   reflect sequela of chronic ischemic microvascular change. Narrative:  EXAM: CT head       INDICATION: Fall, striking head       COMPARISON: CT head dated 10/28/2018. TECHNIQUE: Axial CT imaging of the head was performed without intravenous   contrast. Standard multiplanar coronal and sagittal reformatted images were   obtained and are included in interpretation. One or more dose reduction techniques were used on this CT: automated exposure   control, adjustment of the mAs and/or kVp according to patient size, and   iterative reconstruction techniques. The specific techniques used on this CT   exam have been documented in the patient's electronic medical record.   Digital   Imaging and Communications in Medicine (DICOM) format image data are available   to nonaffiliated external healthcare facilities or entities on a secure, media   free, reciprocally searchable basis with patient authorization for at least a   12-month period after this study. _______________       FINDINGS:       BRAIN AND POSTERIOR FOSSA: Redemonstration of mild cortical and cerebellar   volume loss, within normal limits for patient age. There is no intracranial   hemorrhage, mass effect, or midline shift. Abdomen matter differentiation is   within normal limits. Subcortical and periventricular white matter   low-attenuation is previously. EXTRA-AXIAL SPACES AND MENINGES: There is no acute extra-axial fluid collection. CALVARIUM: Intact as visualized. SINUSES: Clear. OTHER: None.       _______________               CXR Results  (Last 48 hours)    None          Medications given in the ED-  Medications   acetaminophen (TYLENOL) tablet 1,000 mg (1,000 mg Oral Given 4/14/20 1134)         Medical Decision Making   I am the first provider for this patient. I reviewed the vital signs, available nursing notes, past medical history, past surgical history, family history and social history. Vital Signs-Reviewed the patient's vital signs. Records Reviewed: Nursing Notes    Provider Notes (Medical Decision Making): Yasmine Moody is a 78 y.o. male presenting after mechanical fall with hip, knee pain and head strike. Initial knee x-ray concerning for possible tibial plateau fracture but no fracture identified on CT imaging. No other acute abnormalities identified on imaging. UA suspicious for UTI. Urine culture sent and antibiotics initiated. Plan for discharge back to nursing facility with early primary care follow-up and strict return precautions. Procedures:  Procedures    ED Course:   12:41 PM  Updated patient on all results and plan. All questions answered.     Diagnosis and Disposition     Critical Care: None    DISCHARGE NOTE:    Carrie Chavez's  results have been reviewed with him. He has been counseled regarding his diagnosis, treatment, and plan. He verbally conveys understanding and agreement of the signs, symptoms, diagnosis, treatment and prognosis and additionally agrees to follow up as discussed. He also agrees with the care-plan and conveys that all of his questions have been answered. I have also provided discharge instructions for him that include: educational information regarding their diagnosis and treatment, and list of reasons why they would want to return to the ED prior to their follow-up appointment, should his condition change. He has been provided with education for proper emergency department utilization. CLINICAL IMPRESSION:    1. Fall, initial encounter    2. Acute pain of right knee    3. Right hip pain    4. Contusion of scalp, initial encounter    5. Acute cystitis without hematuria        PLAN:  1. D/C Home  2. Current Discharge Medication List      START taking these medications    Details   cephALEXin (Keflex) 500 mg capsule Take 1 Cap by mouth three (3) times daily for 7 days. Qty: 21 Cap, Refills: 0           3. Follow-up Information     Follow up With Specialties Details Why Contact Info    Ced Baxter MD [facility] Schedule an appointment as soon as possible for a visit  [address] [address]  [phone]      THE [facility] EMERGENCY DEPT Emergency Medicine  If symptoms worsen [address] [place] [address]  [phone]        _______________________________      Please note that this dictation was completed with [employer], the computer voice recognition software. Quite often unanticipated grammatical, syntax, homophones, and other interpretive errors are inadvertently transcribed by the computer software. Please disregard these errors. Please excuse any errors that have escaped final proofreading.

## 2020-04-14 NOTE — ED NOTES
Report called to Kevin Martinez LPN, at [facility].  Opportunity for questions given and all questions answered.

## 2020-04-14 NOTE — ED NOTES
Lab called for add-on urine culture,  states they will add-on test to existing specimen. Pt ambulated approximately 8-10ft, pt states this is baseline and denies knee pain during ambulation. MD informed, pt to be d/c'd back to facility.

## 2020-04-14 NOTE — ED NOTES
Discharge instructions given to [facility] transport. All questions answered and pt verbalized understanding. V/S stable @ time of discharge. No lines or drains in place. Pt by stretcher out of unit back to facility.

## 2020-04-14 NOTE — DISCHARGE INSTRUCTIONS
Patient Education        Urinary Tract Infections in Men: Care Instructions  Your Care Instructions    A urinary tract infection, or UTI, is a general term for an infection anywhere between the kidneys and the tip of the penis. UTIs can also be a result of a prostate problem. Most cause pain or burning when you urinate. Most UTIs are caused by bacteria and can be cured with antibiotics. It is important to complete your treatment so that the infection does not get worse. Follow-up care is a key part of your treatment and safety. Be sure to make and go to all appointments, and call your doctor if you are having problems. It's also a good idea to know your test results and keep a list of the medicines you take. How can you care for yourself at home? · Take your antibiotics as prescribed. Do not stop taking them just because you feel better. You need to take the full course of antibiotics. · Take your medicines exactly as prescribed. Your doctor may have prescribed a medicine, such as phenazopyridine (Pyridium), to help relieve pain when you urinate. This turns your urine orange. You may stop taking it when your symptoms get better. But be sure to take all of your antibiotics, which treat the infection. · Drink extra water for the next day or two. This will help make the urine less concentrated and help wash out the bacteria causing the infection. (If you have kidney, heart, or liver disease and have to limit your fluids, talk with your doctor before you increase your fluid intake.)  · Avoid drinks that are carbonated or have caffeine. They can irritate the bladder. · Urinate often. Try to empty your bladder each time. · To relieve pain, take a hot bath or lay a heating pad (set on low) over your lower belly or genital area. Never go to sleep with a heating pad in place. To help prevent UTIs  · Drink plenty of fluids, enough so that your urine is light yellow or clear like water.  If you have kidney, heart, or liver disease and have to limit fluids, talk with your doctor before you increase the amount of fluids you drink. · Urinate when you have the urge. Do not hold your urine for a long time. Urinate before you go to sleep. · Keep your penis clean. Catheter care  If you have a drainage tube (catheter) in place, the following steps will help you care for it. · Always wash your hands before and after touching your catheter. · Check the area around the urethra for inflammation or signs of infection. Signs of infection include irritated, swollen, red, or tender skin, or pus around the catheter. · Clean the area around the catheter with soap and water two times a day. Dry with a clean towel afterward. · Do not apply powder or lotion to the skin around the catheter. To empty the urine collection bag  · Wash your hands with soap and water. · Without touching the drain spout, remove the spout from its sleeve at the bottom of the collection bag. Open the valve on the spout. · Let the urine flow out of the bag and into the toilet or a container. Do not let the tubing or drain spout touch anything. · After you empty the bag, clean the end of the drain spout with tissue and water. Close the valve and put the drain spout back into its sleeve at the bottom of the collection bag. · Wash your hands with soap and water. When should you call for help? Call your doctor now or seek immediate medical care if:    · Symptoms such as a fever, chills, nausea, or vomiting get worse or happen for the first time.     · You have new pain in your back just below your rib cage. This is called flank pain.     · There is new blood or pus in your urine.     · You are not able to take or keep down your antibiotics.    Watch closely for changes in your health, and be sure to contact your doctor if:    · You are not getting better after taking an antibiotic for 2 days.     · Your symptoms go away but then come back.    Where can you learn more?  Go to http://jeanne-wm.info/  Enter O632 in the search box to learn more about \"Urinary Tract Infections in Men: Care Instructions. \"  Current as of: August 21, 2019Content Version: 12.4  © 4115-1131 Healthwise, Incorporated. Care instructions adapted under license by [employer] (which disclaims liability or warranty for this information). If you have questions about a medical condition or this instruction, always ask your healthcare professional. [place] [address] any warranty or liability for your use of this information. Patient Education        Knee Pain or Injury: Care Instructions  Your Care Instructions    Injuries are a common cause of knee problems. Sudden (acute) injuries may be caused by a direct blow to the knee. They can also be caused by abnormal twisting, bending, or falling on the knee. Pain, bruising, or swelling may be severe, and may start within minutes of the injury. Overuse is another cause of knee pain. Other causes are climbing stairs, kneeling, and other activities that use the knee. Everyday wear and tear, especially as you get older, also can cause knee pain. Rest, along with home treatment, often relieves pain and allows your knee to heal. If you have a serious knee injury, you may need tests and treatment. Follow-up care is a key part of your treatment and safety. Be sure to make and go to all appointments, and call your doctor if you are having problems. It's also a good idea to know your test results and keep a list of the medicines you take. How can you care for yourself at home? · Be safe with medicines. Read and follow all instructions on the label. ? If the doctor gave you a prescription medicine for pain, take it as prescribed. ? If you are not taking a prescription pain medicine, ask your doctor if you can take an over-the-counter medicine. · Rest and protect your knee.  Take a break from any activity that may cause pain. · Put ice or a cold pack on your knee for 10 to 20 minutes at a time. Put a thin cloth between the ice and your skin. · Prop up a sore knee on a pillow when you ice it or anytime you sit or lie down for the next 3 days. Try to keep it above the level of your heart. This will help reduce swelling. · If your knee is not swollen, you can put moist heat, a heating pad, or a warm cloth on your knee. · If your doctor recommends an elastic bandage, sleeve, or other type of support for your knee, wear it as directed. · Follow your doctor's instructions about how much weight you can put on your leg. Use a cane, crutches, or a walker as instructed. · Follow your doctor's instructions about activity during your healing process. If you can do mild exercise, slowly increase your activity. · Reach and stay at a healthy weight. Extra weight can strain the joints, especially the knees and hips, and make the pain worse. Losing even a few pounds may help. When should you call for help? Call 911 anytime you think you may need emergency care. For example, call if:    · You have symptoms of a blood clot in your lung (called a pulmonary embolism). These may include:  ? Sudden chest pain. ? Trouble breathing. ? Coughing up blood.    Call your doctor now or seek immediate medical care if:    · You have severe or increasing pain.     · Your leg or foot turns cold or changes color.     · You cannot stand or put weight on your knee.     · Your knee looks twisted or bent out of shape.     · You cannot move your knee.     · You have signs of infection, such as:  ? Increased pain, swelling, warmth, or redness. ? Red streaks leading from the knee. ? Pus draining from a place on your knee. ? A fever.     · You have signs of a blood clot in your leg (called a deep vein thrombosis), such as:  ? Pain in your calf, back of the knee, thigh, or groin. ?  Redness and swelling in your leg or groin.    Watch closely for changes in your health, and be sure to contact your doctor if:    · You have tingling, weakness, or numbness in your knee.     · You have any new symptoms, such as swelling.     · You have bruises from a knee injury that last longer than 2 weeks.     · You do not get better as expected. Where can you learn more? Go to http://jeanne-wm.info/  Enter K195 in the search box to learn more about \"Knee Pain or Injury: Care Instructions. \"  Current as of: June 26, 2019Content Version: 12.4  © 2299-6867 [employer]. Care instructions adapted under license by [employer] (which disclaims liability or warranty for this information). If you have questions about a medical condition or this instruction, always ask your healthcare professional. [address] any warranty or liability for your use of this information. Patient Education        Preventing Falls: Care Instructions  Your Care Instructions    Getting around your home safely can be a challenge if you have injuries or health problems that make it easy for you to fall. Loose rugs and furniture in walkways are among the dangers for many older people who have problems walking or who have poor eyesight. People who have conditions such as arthritis, osteoporosis, or dementia also have to be careful not to fall. You can make your home safer with a few simple measures. Follow-up care is a key part of your treatment and safety. Be sure to make and go to all appointments, and call your doctor if you are having problems. It's also a good idea to know your test results and keep a list of the medicines you take. How can you care for yourself at home? Taking care of yourself  · You may get dizzy if you do not drink enough water. To prevent dehydration, drink plenty of fluids, enough so that your urine is light yellow or clear like water. Choose water and other caffeine-free clear liquids.  If you have kidney, heart, or liver disease and have to limit fluids, talk with your doctor before you increase the amount of fluids you drink. · Exercise regularly to improve your strength, muscle tone, and balance. Walk if you can. Swimming may be a good choice if you cannot walk easily. · Have your vision and hearing checked each year or any time you notice a change. If you have trouble seeing and hearing, you might not be able to avoid objects and could lose your balance. · Know the side effects of the medicines you take. Ask your doctor or pharmacist whether the medicines you take can affect your balance. Sleeping pills or sedatives can affect your balance. · Limit the amount of alcohol you drink. Alcohol can impair your balance and other senses. · Ask your doctor whether calluses or corns on your feet need to be removed. If you wear loose-fitting shoes because of calluses or corns, you can lose your balance and fall. · Talk to your doctor if you have numbness in your feet. Preventing falls at home  · Remove raised doorway thresholds, throw rugs, and clutter. Repair loose carpet or raised areas in the floor. · Move furniture and electrical cords to keep them out of walking paths. · Use nonskid floor wax, and wipe up spills right away, especially on ceramic tile floors. · If you use a walker or cane, put rubber tips on it. If you use crutches, clean the bottoms of them regularly with an abrasive pad, such as steel wool. · Keep your house well lit, especially [facility], and outside walkways. Use night-lights in areas such as hallways and bathrooms. Add extra light switches or use remote switches (such as switches that go on or off when you clap your hands) to make it easier to turn lights on if you have to get up during the night. · Install sturdy handrails on stairways. · Move items in your cabinets so that the things you use a lot are on the lower shelves (about waist level).   · Keep a cordless phone and a flashlight with new batteries by your bed. If possible, put a phone in each of the main rooms of your house, or carry a cell phone in case you fall and cannot reach a phone. Or, you can wear a device around your neck or wrist. You push a button that sends a signal for help. · Wear low-heeled shoes that fit well and give your feet good support. Use footwear with nonskid soles. Check the heels and soles of your shoes for wear. Repair or replace worn heels or soles. · Do not wear socks without shoes on wood floors. · Walk on the grass when the sidewalks are slippery. If you live in an area that gets snow and ice in the winter, sprinkle salt on slippery steps and sidewalks. Preventing falls in the bath  · Install grab bars and nonskid mats inside and outside your shower or tub and near the toilet and sinks. · Use shower chairs and bath benches. · Use a hand-held shower head that will allow you to sit while showering. · Get into a tub or shower by putting the weaker leg in first. Get out of a tub or shower with your strong side first.  · Repair loose toilet seats and consider installing a raised toilet seat to make getting on and off the toilet easier. · Keep your bathroom door unlocked while you are in the shower. Where can you learn more? Go to http://jeanne-wm.info/  Enter G117 in the search box to learn more about \"Preventing Falls: Care Instructions. \"  Current as of: August 6, 2019Content Version: 12.4  © 9785-4945 Healthwise, Incorporated. Care instructions adapted under license by [employer] (which disclaims liability or warranty for this information). If you have questions about a medical condition or this instruction, always ask your healthcare professional. [place] [address] any warranty or liability for your use of this information.

## 2020-04-14 NOTE — ED TRIAGE NOTES
Pt to ED via EMS from [facility], pt reports experiencing trip and fall this morning around 5029-3157, pt thinks he may have hit head, but denies LOC, pt states main c/o was right hip pain. Pt arrives to ED AOx3, disoriented to time, states year is either 2019 or 2020, but unsure on the month. Pt's right leg appears of similar length to left leg and is NOT externally rotated, PMS intact distal to injury. Pt placed on cardiac monitoring x3.

## 2020-04-15 LAB
BACTERIA SPEC CULT: NORMAL
SERVICE CMNT-IMP: NORMAL

## 2020-04-15 RX ORDER — CEPHALEXIN 500 MG/1
500 CAPSULE ORAL 3 TIMES DAILY
Qty: 21 CAP | Refills: 0 | Status: SHIPPED | OUTPATIENT
Start: 2020-04-15 | End: 2020-04-22

## 2020-04-20 ENCOUNTER — APPOINTMENT (OUTPATIENT)
Dept: ULTRASOUND IMAGING | Age: 80
End: 2020-04-20
Attending: PHYSICIAN ASSISTANT
Payer: MEDICARE

## 2020-04-20 ENCOUNTER — APPOINTMENT (OUTPATIENT)
Dept: CT IMAGING | Age: 80
End: 2020-04-20
Attending: PHYSICIAN ASSISTANT
Payer: MEDICARE

## 2020-04-20 ENCOUNTER — HOSPITAL ENCOUNTER (EMERGENCY)
Age: 80
Discharge: HOME | End: 2020-04-20
Attending: EMERGENCY MEDICINE
Payer: MEDICARE

## 2020-04-20 VITALS
SYSTOLIC BLOOD PRESSURE: 103 MMHG | DIASTOLIC BLOOD PRESSURE: 62 MMHG | RESPIRATION RATE: 16 BRPM | HEART RATE: 69 BPM | OXYGEN SATURATION: 100 % | TEMPERATURE: 97.9 F

## 2020-04-20 DIAGNOSIS — R31.9 URINARY TRACT INFECTION WITH HEMATURIA, SITE UNSPECIFIED: ICD-10-CM

## 2020-04-20 DIAGNOSIS — N39.0 URINARY TRACT INFECTION WITH HEMATURIA, SITE UNSPECIFIED: ICD-10-CM

## 2020-04-20 DIAGNOSIS — N20.1 RIGHT URETERAL STONE: Primary | ICD-10-CM

## 2020-04-20 LAB
ALBUMIN SERPL-MCNC: 3.2 G/DL (ref 3.4–5)
ALBUMIN/GLOB SERPL: 0.8 {RATIO} (ref 0.8–1.7)
ALP SERPL-CCNC: 140 U/L (ref 45–117)
ALT SERPL-CCNC: 23 U/L (ref 16–61)
ANION GAP SERPL CALC-SCNC: 4 MMOL/L (ref 3–18)
APPEARANCE UR: CLEAR
AST SERPL-CCNC: 33 U/L (ref 10–38)
BACTERIA URNS QL MICRO: ABNORMAL /HPF
BASOPHILS # BLD: 0 K/UL (ref 0–0.1)
BASOPHILS NFR BLD: 0 % (ref 0–2)
BILIRUB SERPL-MCNC: 1.6 MG/DL (ref 0.2–1)
BILIRUB UR QL: ABNORMAL
BUN SERPL-MCNC: 25 MG/DL (ref 7–18)
BUN/CREAT SERPL: 21 (ref 12–20)
CALCIUM SERPL-MCNC: 9.6 MG/DL (ref 8.5–10.1)
CHLORIDE SERPL-SCNC: 107 MMOL/L (ref 100–111)
CO2 SERPL-SCNC: 28 MMOL/L (ref 21–32)
COLOR UR: ABNORMAL
CREAT SERPL-MCNC: 1.17 MG/DL (ref 0.6–1.3)
DIFFERENTIAL METHOD BLD: ABNORMAL
EOSINOPHIL # BLD: 0.1 K/UL (ref 0–0.4)
EOSINOPHIL NFR BLD: 2 % (ref 0–5)
EPITH CASTS URNS QL MICRO: ABNORMAL /LPF (ref 0–5)
ERYTHROCYTE [DISTWIDTH] IN BLOOD BY AUTOMATED COUNT: 13.8 % (ref 11.6–14.5)
GLOBULIN SER CALC-MCNC: 4.2 G/DL (ref 2–4)
GLUCOSE SERPL-MCNC: 112 MG/DL (ref 74–99)
GLUCOSE UR STRIP.AUTO-MCNC: NEGATIVE MG/DL
HCT VFR BLD AUTO: 41.3 % (ref 36–48)
HGB BLD-MCNC: 14.1 G/DL (ref 13–16)
HGB UR QL STRIP: ABNORMAL
KETONES UR QL STRIP.AUTO: ABNORMAL MG/DL
LEUKOCYTE ESTERASE UR QL STRIP.AUTO: ABNORMAL
LIPASE SERPL-CCNC: 109 U/L (ref 73–393)
LYMPHOCYTES # BLD: 1.7 K/UL (ref 0.9–3.6)
LYMPHOCYTES NFR BLD: 28 % (ref 21–52)
MCH RBC QN AUTO: 34.1 PG (ref 24–34)
MCHC RBC AUTO-ENTMCNC: 34.1 G/DL (ref 31–37)
MCV RBC AUTO: 99.8 FL (ref 74–97)
MONOCYTES # BLD: 1 K/UL (ref 0.05–1.2)
MONOCYTES NFR BLD: 16 % (ref 3–10)
NEUTS SEG # BLD: 3.4 K/UL (ref 1.8–8)
NEUTS SEG NFR BLD: 54 % (ref 40–73)
NITRITE UR QL STRIP.AUTO: NEGATIVE
PH UR STRIP: 5.5 [PH] (ref 5–8)
PLATELET # BLD AUTO: 165 K/UL (ref 135–420)
PMV BLD AUTO: 10.2 FL (ref 9.2–11.8)
POTASSIUM SERPL-SCNC: 4.2 MMOL/L (ref 3.5–5.5)
PROT SERPL-MCNC: 7.4 G/DL (ref 6.4–8.2)
PROT UR STRIP-MCNC: 30 MG/DL
RBC # BLD AUTO: 4.14 M/UL (ref 4.7–5.5)
RBC #/AREA URNS HPF: ABNORMAL /HPF (ref 0–5)
SODIUM SERPL-SCNC: 139 MMOL/L (ref 136–145)
SP GR UR REFRACTOMETRY: >1.03 (ref 1–1.03)
UROBILINOGEN UR QL STRIP.AUTO: 2 EU/DL (ref 0.2–1)
WBC # BLD AUTO: 6.2 K/UL (ref 4.6–13.2)
WBC URNS QL MICRO: ABNORMAL /HPF (ref 0–5)

## 2020-04-20 PROCEDURE — 81001 URINALYSIS AUTO W/SCOPE: CPT

## 2020-04-20 PROCEDURE — 99284 EMERGENCY DEPT VISIT MOD MDM: CPT

## 2020-04-20 PROCEDURE — 74177 CT ABD & PELVIS W/CONTRAST: CPT

## 2020-04-20 PROCEDURE — 85025 COMPLETE CBC W/AUTO DIFF WBC: CPT

## 2020-04-20 PROCEDURE — 80053 COMPREHEN METABOLIC PANEL: CPT

## 2020-04-20 PROCEDURE — 96374 THER/PROPH/DIAG INJ IV PUSH: CPT

## 2020-04-20 PROCEDURE — 74011636320 HC RX REV CODE- 636/320: Performed by: EMERGENCY MEDICINE

## 2020-04-20 PROCEDURE — 87086 URINE CULTURE/COLONY COUNT: CPT

## 2020-04-20 PROCEDURE — 83690 ASSAY OF LIPASE: CPT

## 2020-04-20 PROCEDURE — 76705 ECHO EXAM OF ABDOMEN: CPT

## 2020-04-20 PROCEDURE — 74011250637 HC RX REV CODE- 250/637: Performed by: PHYSICIAN ASSISTANT

## 2020-04-20 PROCEDURE — 74011250636 HC RX REV CODE- 250/636: Performed by: PHYSICIAN ASSISTANT

## 2020-04-20 PROCEDURE — 74011000250 HC RX REV CODE- 250: Performed by: PHYSICIAN ASSISTANT

## 2020-04-20 RX ORDER — LEVOFLOXACIN 250 MG/1
250 TABLET ORAL DAILY
Qty: 5 TAB | Refills: 0 | Status: SHIPPED | OUTPATIENT
Start: 2020-04-20 | End: 2020-04-25

## 2020-04-20 RX ORDER — ACETAMINOPHEN 325 MG/1
650 TABLET ORAL
Status: COMPLETED | OUTPATIENT
Start: 2020-04-20 | End: 2020-04-20

## 2020-04-20 RX ORDER — TRAMADOL HYDROCHLORIDE 50 MG/1
50 TABLET ORAL
Qty: 10 TAB | Refills: 0 | Status: SHIPPED | OUTPATIENT
Start: 2020-04-20 | End: 2020-04-23

## 2020-04-20 RX ORDER — LEVOFLOXACIN 250 MG/1
250 TABLET ORAL
Status: COMPLETED | OUTPATIENT
Start: 2020-04-20 | End: 2020-04-20

## 2020-04-20 RX ADMIN — IOPAMIDOL 100 ML: 612 INJECTION, SOLUTION INTRAVENOUS at 17:31

## 2020-04-20 RX ADMIN — LEVOFLOXACIN 250 MG: 250 TABLET, FILM COATED ORAL at 20:53

## 2020-04-20 RX ADMIN — CEFTRIAXONE 2 G: 2 INJECTION, POWDER, FOR SOLUTION INTRAMUSCULAR; INTRAVENOUS at 20:24

## 2020-04-20 RX ADMIN — ACETAMINOPHEN 650 MG: 325 TABLET ORAL at 16:18

## 2020-04-20 NOTE — ED NOTES
Pt advised he couldn't provide a urine sample at this time, a urinal was provided for him and asked to call when he provided a sample.

## 2020-04-20 NOTE — ED TRIAGE NOTES
Pt arrives via ems stretcher from [address] [place] [address] with c\o RIGHT flank pain that radiates to right groin, per pts son, pt was supposed to have stent placed last week but due to restrictions of pandemic was unable      Patient denies fever, SOB, cough, C19 exposure, recent travel (30 days), pt is not coming from shelter, nursing home, or other hospital or live-in facility.

## 2020-04-20 NOTE — ED PROVIDER NOTES
EMERGENCY DEPARTMENT HISTORY AND PHYSICAL EXAM    Date: 4/20/2020  Patient Name: Chloe Aponte    History of Presenting Illness     Chief Complaint   Patient presents with    Flank Pain       History Provided By: Patient's Son and EMS    Chief Complaint: abd pain/flank pain       Additional History (Context):   2:51 PM  Chloe Aponte is a 78 y.o. male with PMHX hypertension, Parkinson's disease, BPH, GERD, osteopenia presents to the emergency department by EMS from [facility] c/O right-sided abdominal pain and flank pain. History obtained by patient's son as patient was a poor historian likely due to history of Parkinson. Patient's son reported fall a couple months ago resulting in a small hip fracture, at that time they also found a 1 inch kidney stone and a UTI which was treated. They had planned procedure but was postponed secondary to the fracture. He did fall again on 4/14 and was seen in the ED at that time. He had a CT of his knee and x-ray of his other knee and hip and a head CT. No acute abnormality seen on imaging at that time but he was found to have a UTI and started on Keflex. PCP: Tavon Rodriguez MD    Current Outpatient Medications   Medication Sig Dispense Refill    levoFLOXacin (Levaquin) 250 mg tablet Take 1 Tab by mouth daily for 5 days. 5 Tab 0    traMADoL (Ultram) 50 mg tablet Take 1 Tab by mouth every six (6) hours as needed for Pain for up to 3 days. Max Daily Amount: 200 mg. 10 Tab 0    cephALEXin (Keflex) 500 mg capsule Take 1 Cap by mouth three (3) times daily for 7 days. 21 Cap 0    acetaminophen (TylenoL) 325 mg tablet Take 650 mg by mouth every four (4) hours as needed for Pain.  amLODIPine (NORVASC) 2.5 mg tablet Take 2.5 mg by mouth daily.  lidocaine (Aspercreme, lidocaine,) 4 % topical cream Apply  to affected area three (3) times daily as needed for Pain.  For knee pain      benztropine (COGENTIN) 0.5 mg tablet Take 0.5 mg by mouth two (2) times a day.      melatonin 5 mg cap capsule Take 5 mg by mouth nightly.  tolterodine ER (DETROL-LA) 2 mg ER capsule Take 2 mg by mouth daily.  ibuprofen (MOTRIN) 600 mg tablet Take 1 Tab by mouth every six (6) hours as needed for Pain. 20 Tab 0    alendronate (FOSAMAX) 70 mg tablet Take 70 mg by mouth every seven (7) days. Every week on Sunday      aspirin delayed-release 81 mg tablet Take  by mouth daily.  carbidopa-levodopa (SINEMET)  mg per tablet Take 1 Tab by mouth three (3) times daily.  pantoprazole (PROTONIX) 40 mg tablet Take 40 mg by mouth daily.  Vit A,C,E-Zinc-Copper (PRESERVISION AREDS) cap capsule Take 1 Cap by mouth.  spironolactone (ALDACTONE) 25 mg tablet Take 25 mg by mouth daily.  tamsulosin (FLOMAX) 0.4 mg capsule Take 0.4 mg by mouth daily.  cyanocobalamin (VITAMIN B-12) 100 mcg tablet Take 2,500 mcg by mouth daily.  ergocalciferol (VITAMIN D2) 50,000 unit capsule Take 50,000 Units by mouth.  cholecalciferol (VITAMIN D3) 1,000 unit cap Take 2,000 Units by mouth daily. Past History     Past Medical History:  Past Medical History:   Diagnosis Date    BPH (benign prostatic hyperplasia)     GERD (gastroesophageal reflux disease)     Hypertension     Osteopenia     Parkinson's disease ([facility] [address]        Past Surgical History:  No past surgical history on file. Family History:  No family history on file. Social History:  Social History     Tobacco Use    Smoking status: Former Smoker    Smokeless tobacco: Never Used   Substance Use Topics    Alcohol use: No     Frequency: Never    Drug use: No       Allergies:  No Known Allergies    Review of Systems   Review of Systems   Constitutional: Negative for chills and fever. Respiratory: Negative for shortness of breath. Cardiovascular: Negative for chest pain. Gastrointestinal: Positive for abdominal pain. Negative for diarrhea, nausea and vomiting.    Genitourinary: Positive for flank pain. Neurological: Negative for weakness and numbness. All other systems reviewed and are negative. Physical Exam     Vitals:    04/20/20 1359 04/20/20 2030   BP: 146/81 135/67   Pulse: 76 69   Resp: 14 16   Temp: 98.4 °F (36.9 °C) 97.9 °F (36.6 °C)   SpO2: 98% 100%     Physical Exam  Vitals signs and nursing note reviewed. Constitutional:       Appearance: He is well-developed. Comments: Elderly frail seems somewhat confused at times but is able to answer some questions   HENT:      Head: Normocephalic and atraumatic. Neck:      Musculoskeletal: Normal range of motion and neck supple. Cardiovascular:      Rate and Rhythm: Normal rate and regular rhythm. Heart sounds: Normal heart sounds. No murmur. Pulmonary:      Effort: Pulmonary effort is normal. No respiratory distress. Breath sounds: Normal breath sounds. No wheezing or rales. Abdominal:      General: Bowel sounds are normal.      Palpations: Abdomen is soft. Tenderness: There is abdominal tenderness in the right lower quadrant and suprapubic area. There is no right CVA tenderness or left CVA tenderness. Musculoskeletal:      Comments: Right hip: Nontender to palpation, no deformity, secondary to pain   Neurological:      Mental Status: He is alert and oriented to person, place, and time.    Psychiatric:         Judgment: Judgment normal.           Diagnostic Study Results     Labs:     Recent Results (from the past 12 hour(s))   CBC WITH AUTOMATED DIFF    Collection Time: 04/20/20  2:40 PM   Result Value Ref Range    WBC 6.2 4.6 - 13.2 K/uL    RBC 4.14 (L) 4.70 - 5.50 M/uL    HGB 14.1 13.0 - 16.0 g/dL    HCT 41.3 36.0 - 48.0 %    MCV 99.8 (H) 74.0 - 97.0 FL    MCH 34.1 (H) 24.0 - 34.0 PG    MCHC 34.1 31.0 - 37.0 g/dL    RDW 13.8 11.6 - 14.5 %    PLATELET 803 951 - 180 K/uL    MPV 10.2 9.2 - 11.8 FL    NEUTROPHILS 54 40 - 73 %    LYMPHOCYTES 28 21 - 52 %    MONOCYTES 16 (H) 3 - 10 %    EOSINOPHILS 2 0 - 5 % BASOPHILS 0 0 - 2 %    ABS. NEUTROPHILS 3.4 1.8 - 8.0 K/UL    ABS. LYMPHOCYTES 1.7 0.9 - 3.6 K/UL    ABS. MONOCYTES 1.0 0.05 - 1.2 K/UL    ABS. EOSINOPHILS 0.1 0.0 - 0.4 K/UL    ABS. BASOPHILS 0.0 0.0 - 0.1 K/UL    DF AUTOMATED     METABOLIC PANEL, COMPREHENSIVE    Collection Time: 04/20/20  2:40 PM   Result Value Ref Range    Sodium 139 136 - 145 mmol/L    Potassium 4.2 3.5 - 5.5 mmol/L    Chloride 107 100 - 111 mmol/L    CO2 28 21 - 32 mmol/L    Anion gap 4 3.0 - 18 mmol/L    Glucose 112 (H) 74 - 99 mg/dL    BUN 25 (H) 7.0 - 18 MG/DL    Creatinine 1.17 0.6 - 1.3 MG/DL    BUN/Creatinine ratio 21 (H) 12 - 20      GFR est AA >60 >60 ml/min/1.73m2    GFR est non-AA 60 (L) >60 ml/min/1.73m2    Calcium 9.6 8.5 - 10.1 MG/DL    Bilirubin, total 1.6 (H) 0.2 - 1.0 MG/DL    ALT (SGPT) 23 16 - 61 U/L    AST (SGOT) 33 10 - 38 U/L    Alk.  phosphatase 140 (H) 45 - 117 U/L    Protein, total 7.4 6.4 - 8.2 g/dL    Albumin 3.2 (L) 3.4 - 5.0 g/dL    Globulin 4.2 (H) 2.0 - 4.0 g/dL    A-G Ratio 0.8 0.8 - 1.7     LIPASE    Collection Time: 04/20/20  2:40 PM   Result Value Ref Range    Lipase 109 73 - 393 U/L   URINALYSIS W/ RFLX MICROSCOPIC    Collection Time: 04/20/20  7:02 PM   Result Value Ref Range    Color DARK YELLOW      Appearance CLEAR      Specific gravity >1.030 (H) 1.005 - 1.030    pH (UA) 5.5 5.0 - 8.0      Protein 30 (A) NEG mg/dL    Glucose Negative NEG mg/dL    Ketone TRACE (A) NEG mg/dL    Bilirubin SMALL (A) NEG      Blood SMALL (A) NEG      Urobilinogen 2.0 (H) 0.2 - 1.0 EU/dL    Nitrites Negative NEG      Leukocyte Esterase MODERATE (A) NEG     URINE MICROSCOPIC ONLY    Collection Time: 04/20/20  7:02 PM   Result Value Ref Range    WBC 51 to 60 0 - 5 /hpf    RBC 4 to 10 0 - 5 /hpf    Epithelial cells FEW 0 - 5 /lpf    Bacteria 1+ (A) NEG /hpf       Radiologic Studies:   CT ABD PELV W CONT   Final Result   IMPRESSION:      Moderate right obstructive uropathy related to a 1.8 x 2.7 cm calculus at the   right ureteropelvic junction. Additional, nonobstructive right renal calculi. US GALLBLADDER   Final Result   IMPRESSION:      1. Right renal calculi measure up to 2.2 cm. There is also moderate right   hydronephrosis. Suspect an acute right obstructive uropathy, etiology of which   is incompletely visualized on this ultrasound exam.   2. Nonmobile gallstones with no additional secondary findings of cholecystitis. Suspected mild gallbladder wall thickening is artifactual related to only   minimal gallbladder distention. 3. Diffuse hepatic steatosis. CT Results  (Last 48 hours)               04/20/20 8194  CT ABD PELV W CONT Final result    Impression:  IMPRESSION:       Moderate right obstructive uropathy related to a 1.8 x 2.7 cm calculus at the   right ureteropelvic junction. Additional, nonobstructive right renal calculi. Narrative:  EXAM: CT ABD PELV W CONT       CLINICAL INDICATION/HISTORY: Right-sided abdominal pain       COMPARISON: Right upper quadrant ultrasound same day       TECHNIQUE:   CT of the abdomen and pelvis following intravenous contrast   administration. Coronal and sagittal reformats were generated and reviewed. One or more dose reduction techniques were used on this CT: automated exposure   control, adjustment of the mAs and/or kVp according to patient size, and   iterative reconstruction techniques. The specific techniques used on this CT   exam have been documented in the patient's electronic medical record. Digital   Imaging and Communications in Medicine (DICOM) format image data are available   to nonaffiliated external healthcare facilities or entities on a secure, media   free, reciprocally searchable basis with patient authorization for at least a   12-month period after this study.            _______________       FINDINGS:       LOWER THORAX: Atelectasis and fibrotic changes are seen throughout visualized lung bases. No acute pulmonary infiltrate. No pleural effusion. Mild global   cardiomegaly. LIVER AND BILIARY: No suspicious liver lesions. No biliary dilation. Lamellated gallstone is seen within the nondistended gallbladder. SPLEEN:  Normal.       PANCREAS:  Normal.       ADRENALS:  Normal.       KIDNEYS:       > Simple appearing left renal cyst. No hydronephrosis or acute obstructive   uropathy on the left.      > Moderate right hydronephrosis related to a large macrolobulated calculus at   the level of the right ureteropelvic junction (image 39) which measures   approximately 1.8 x 2.7 cm (averaging 866 Hounsfield units). Beyond this stone   the more distal right ureter returns to normal caliber. There are additional,   nonobstructive right renal calculi that measure 2-8 mm. LYMPH NODES:  No mesenteric or retroperitoneal lymphadenopathy. GI TRACT:  Small hiatal hernia. Normal caliber small and large bowel loops. No   morphology of bowel obstruction. No bowel wall thickening. Normal appendix. PELVIC ORGANS:  Bladder is normal in appearance. No acute abnormality. VASCULATURE:  Several prominent left-sided splenorenal collaterals are seen. Normal caliber lower thoracic and abdominal aorta with moderate atherosclerotic   vascular calcification. OTHER:   No ascites or free intraperitoneal air. A few normal caliber small   bowel loops extend into a moderate-sized right inguinal hernia. Smaller, fat   filled left inguinal hernia. OSSEOUS STRUCTURES:  No acute osseous abnormality. Chronic appearing moderate to   advanced wedge compression deformities of the T10, T12, L2, and L4 vertebral   bodies. Mild multilevel degenerative disk disease and facet arthropathy. _______________               CXR Results  (Last 48 hours)    None          Medical Decision Making   I am the first provider for this patient.     I reviewed the vital signs, available nursing notes, past medical history, past surgical history, family history and social history. Vital Signs: Reviewed the patient's vital signs. Pulse Oximetry Analysis: 98% on RA       Records Reviewed: Nursing Notes and Old Medical Records    Procedures:  Procedures    ED Course:   2:51 PM Initial assessment performed. The patients presenting problems have been discussed, and they are in agreement with the care plan formulated and outlined with them. I have encouraged them to ask questions as they arise throughout their visit. Case discussed with Jaden Del Cid MD. since the patient is nontoxic afebrile and without leukocytosis recommends changing antibiotic to Levaquin since creatinine is normal.  Having patient follow-up as an outpatient tomorrow. Reviewed CT findings. Spoke with patient's son about all findings and plan to change antibiotics and follow-up with urology tomorrow. He understands and agrees with plan    Discussion:  Pt with a history of dementia presents for right-sided abdominal pain/hip pain. He has a known kidney stone on the right side. He was seen here 6 days ago after a fall and UTI incidentally found. There is been no other falls or injuries since that time. Urine still appears infected. Urine culture sent. CT scan of the abdomen shows large right-sided stone with obstruction. Case discussed with ED attending and urologist on call. Urologist recommends discharge with Levaquin and close follow-up tomorrow in the office as opposed to hospitalization since he is afebrile and nontoxic and without temperature or elevated white blood cell count strict return precautions given, pt offering no questions or complaints. Diagnosis and Disposition     DISCHARGE NOTE:  Ryanreal Chavez's  results have been reviewed with him. He has been counseled regarding his diagnosis, treatment, and plan.   He verbally conveys understanding and agreement of the signs, symptoms, diagnosis, treatment and prognosis and additionally agrees to follow up as discussed. He also agrees with the care-plan and conveys that all of his questions have been answered. I have also provided discharge instructions for him that include: educational information regarding their diagnosis and treatment, and list of reasons why they would want to return to the ED prior to their follow-up appointment, should his condition change. He has been provided with education for proper emergency department utilization. CLINICAL IMPRESSION:    1. Right ureteral stone    2. Urinary tract infection with hematuria, site unspecified        PLAN:  1. D/C Home  2. Current Discharge Medication List      START taking these medications    Details   levoFLOXacin (Levaquin) 250 mg tablet Take 1 Tab by mouth daily for 5 days. Qty: 5 Tab, Refills: 0      traMADoL (Ultram) 50 mg tablet Take 1 Tab by mouth every six (6) hours as needed for Pain for up to 3 days. Max Daily Amount: 200 mg. Qty: 10 Tab, Refills: 0    Associated Diagnoses: Right ureteral stone; Urinary tract infection with hematuria, site unspecified           3. Follow-up Information     Follow up With Specialties Details Why Jared Helm MD Urology  call for follow up and recheck  [address] [id number]  [phone]      [facility] EMERGENCY DEPT Emergency Medicine  If symptoms worsen [address] [facility]  [phone]    Freedom Hall MD Urology  call for follow up and recheck  [address] [facility]  [phone]              Please note that this dictation was completed with Kutenda, the computer voice recognition software. Quite often unanticipated grammatical, syntax, homophones, and other interpretive errors are inadvertently transcribed by the computer software. Please disregard these errors. Please excuse any errors that have escaped final proofreading.

## 2020-04-22 LAB
BACTERIA SPEC CULT: NORMAL
SERVICE CMNT-IMP: NORMAL

## 2022-08-07 NOTE — PROGRESS NOTES
Hospitalist Progress Note Patient: Abhilash Huber MRN: [id number]  CSN: [id number] YOB: 1940  Age: [de-identified] y.o. Sex: male DOA: 10/28/2018 LOS:  LOS: 1 day Assessment/Plan Patient Active Problem List  
Diagnosis Code  Hypoxia R09.02  
 Parkinson's disease ([facility] [address] G20  
 GERD (gastroesophageal reflux disease) K21.9  Hypertension I10  
 BPH (benign prostatic hyperplasia) N40.0  Acute cystitis without hematuria N30.00  Pneumonia J18.9  Compression fracture of body of thoracic vertebra ([facility] [address] S22.000A  
  
 
[de-identified] yo male admitted for fall, compression fracture and hypoxia Hypoxia - CT scan chest showed no evidence of PE, Lower lung field interstitial coarsening may be chronic versus bronchitis/pneumonitis. Encourage incentive spirometry, oxygen by NC, wean as tolerated.  
  
Pneumonia - on ceftriaxone and azithromycin.  
  
UTI - antibiotics as above, no growth on urine cultures.  
  
HTN - controlled on home medications. 
  
Parkinson's disease - continue levadopa carbidopa 
  
BPH - on flomax 
  
GERD - continue with PPI 
  
Compression fracture - thoracic spine, no back pain. PT/OT Ortho consulted 
  
DVT prophylaxis. Disposition : 1-2 days Review of systems General: No fevers or chills. Cardiovascular: No chest pain or pressure. No palpitations. Pulmonary: No shortness of breath. Gastrointestinal: No nausea, vomiting. Physical Exam: 
General: Awake, cooperative, no acute distress   
HEENT: NC, Atraumatic. PERRLA, anicteric sclerae. Lungs: CTA Bilaterally. No Wheezing/Rhonchi/Rales. Heart:  Regular  rhythm,  No murmur, No Rubs, No Gallops Abdomen: Soft, Non distended, Non tender.  +Bowel sounds, Extremities: No c/c/e Psych:   Not anxious or agitated. Neurologic:  No acute neurological deficit. Resting tremor Vital signs/Intake and Output: 
Visit Vitals /47 (BP 1 Location: Left arm, BP Patient Position: At rest) Pulse (!) 48 Temp 98.3 °F (36.8 °C) Resp 18 Ht 5' 11\" (1.803 m) Wt 80.3 kg (177 lb) SpO2 94% BMI 24.69 kg/m² Current Shift:  10/30 0701 - 10/30 1900 In: 0 Out: 500 [Urine:500] Last three shifts:  10/28 1901 - 10/30 0700 In: 1330 [P.O.:1080; I.V.:250] Out: 250 [Urine:250] Labs: Results:  
   
Chemistry Recent Labs 10/30/18 
0325 10/28/18 
1840 GLU 84 85  139  
K 4.7 4.2  105 CO2 25 20* BUN 19* 20* CREA 1.13 1.30  
CA 8.9 9.2 AGAP 9 14 BUCR 17 15 AP  --  83  
TP  --  7.0 ALB  --  3.2*  
GLOB  --  3.8 AGRAT  --  0.8 CBC w/Diff Recent Labs 10/30/18 
0325 10/28/18 
2127 WBC 8.1 9.8  
RBC 3.50* 3.73* HGB 11.9* 12.8* HCT 35.7* 38.1 * 120* GRANS  --  63  
LYMPH  --  28  
EOS  --  1 Cardiac Enzymes Recent Labs 10/28/18 
1840  CKND1 1.2 Coagulation No results for input(s): PTP, INR, APTT in the last 72 hours. No lab exists for component: INREXT Lipid Panel No results found for: CHOL, CHOLPOCT, CHOLX, CHLST, CHOLV, 696182, HDL, LDL, LDLC, DLDLP, 854044, VLDLC, VLDL, TGLX, TRIGL, TRIGP, TGLPOCT, CHHD, CHHDX  
BNP No results for input(s): BNPP in the last 72 hours. Liver Enzymes Recent Labs 10/28/18 
1840 TP 7.0 ALB 3.2* AP 83 SGOT 29 Thyroid Studies No results found for: T4, T3U, TSH, TSHEXT Procedures/imaging: see electronic medical records for all procedures/Xrays and details which were not copied into this note but were reviewed prior to creation of Plan 98